# Patient Record
Sex: FEMALE | Race: WHITE | HISPANIC OR LATINO
[De-identification: names, ages, dates, MRNs, and addresses within clinical notes are randomized per-mention and may not be internally consistent; named-entity substitution may affect disease eponyms.]

---

## 2022-09-22 ENCOUNTER — APPOINTMENT (OUTPATIENT)
Dept: ANTEPARTUM | Facility: CLINIC | Age: 32
End: 2022-09-22

## 2022-09-22 ENCOUNTER — ASOB RESULT (OUTPATIENT)
Age: 32
End: 2022-09-22

## 2022-09-22 PROBLEM — Z00.00 ENCOUNTER FOR PREVENTIVE HEALTH EXAMINATION: Status: ACTIVE | Noted: 2022-09-22

## 2022-09-22 PROCEDURE — 76801 OB US < 14 WKS SINGLE FETUS: CPT

## 2022-11-03 ENCOUNTER — APPOINTMENT (OUTPATIENT)
Dept: ANTEPARTUM | Facility: CLINIC | Age: 32
End: 2022-11-03

## 2022-11-03 ENCOUNTER — ASOB RESULT (OUTPATIENT)
Age: 32
End: 2022-11-03

## 2022-11-03 PROCEDURE — 76805 OB US >/= 14 WKS SNGL FETUS: CPT

## 2022-11-03 PROCEDURE — 76817 TRANSVAGINAL US OBSTETRIC: CPT | Mod: 59

## 2022-11-17 ENCOUNTER — ASOB RESULT (OUTPATIENT)
Age: 32
End: 2022-11-17

## 2022-11-17 ENCOUNTER — APPOINTMENT (OUTPATIENT)
Dept: ANTEPARTUM | Facility: CLINIC | Age: 32
End: 2022-11-17

## 2022-11-17 PROCEDURE — 76816 OB US FOLLOW-UP PER FETUS: CPT

## 2023-01-12 ENCOUNTER — ASOB RESULT (OUTPATIENT)
Age: 33
End: 2023-01-12

## 2023-01-12 ENCOUNTER — APPOINTMENT (OUTPATIENT)
Dept: ANTEPARTUM | Facility: CLINIC | Age: 33
End: 2023-01-12
Payer: MEDICAID

## 2023-01-12 PROCEDURE — 76816 OB US FOLLOW-UP PER FETUS: CPT

## 2023-01-12 PROCEDURE — 76817 TRANSVAGINAL US OBSTETRIC: CPT

## 2023-02-19 ENCOUNTER — INPATIENT (INPATIENT)
Facility: HOSPITAL | Age: 33
LOS: 1 days | Discharge: HOME | DRG: 833 | End: 2023-02-21
Attending: OBSTETRICS & GYNECOLOGY | Admitting: OBSTETRICS & GYNECOLOGY
Payer: MEDICAID

## 2023-02-19 VITALS — SYSTOLIC BLOOD PRESSURE: 138 MMHG | DIASTOLIC BLOOD PRESSURE: 92 MMHG | HEART RATE: 107 BPM

## 2023-02-19 DIAGNOSIS — Z3A.35 35 WEEKS GESTATION OF PREGNANCY: ICD-10-CM

## 2023-02-19 DIAGNOSIS — Z00.00 ENCOUNTER FOR GENERAL ADULT MEDICAL EXAMINATION WITHOUT ABNORMAL FINDINGS: ICD-10-CM

## 2023-02-19 DIAGNOSIS — Z90.49 ACQUIRED ABSENCE OF OTHER SPECIFIED PARTS OF DIGESTIVE TRACT: Chronic | ICD-10-CM
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
## 2023-02-19 DIAGNOSIS — O14.93 UNSPECIFIED PRE-ECLAMPSIA, THIRD TRIMESTER: ICD-10-CM

## 2023-02-19 DIAGNOSIS — O47.02 FALSE LABOR BEFORE 37 COMPLETED WEEKS OF GESTATION, SECOND TRIMESTER: ICD-10-CM

## 2023-02-19 LAB
ABO RH CONFIRMATION: SIGNIFICANT CHANGE UP
ALBUMIN SERPL ELPH-MCNC: 3.1 G/DL — LOW (ref 3.3–5.2)
ALBUMIN SERPL ELPH-MCNC: 3.5 G/DL — SIGNIFICANT CHANGE UP (ref 3.3–5.2)
ALP SERPL-CCNC: 127 U/L — HIGH (ref 40–120)
ALP SERPL-CCNC: 140 U/L — HIGH (ref 40–120)
ALT FLD-CCNC: 6 U/L — SIGNIFICANT CHANGE UP
ALT FLD-CCNC: 8 U/L — SIGNIFICANT CHANGE UP
ANION GAP SERPL CALC-SCNC: 10 MMOL/L — SIGNIFICANT CHANGE UP (ref 5–17)
ANION GAP SERPL CALC-SCNC: 15 MMOL/L — SIGNIFICANT CHANGE UP (ref 5–17)
APPEARANCE UR: CLEAR — SIGNIFICANT CHANGE UP
APTT BLD: 23.1 SEC — LOW (ref 27.5–35.5)
APTT BLD: 23.2 SEC — LOW (ref 27.5–35.5)
AST SERPL-CCNC: 15 U/L — SIGNIFICANT CHANGE UP
AST SERPL-CCNC: 16 U/L — SIGNIFICANT CHANGE UP
BACTERIA # UR AUTO: ABNORMAL
BASOPHILS # BLD AUTO: 0.01 K/UL — SIGNIFICANT CHANGE UP (ref 0–0.2)
BASOPHILS # BLD AUTO: 0.02 K/UL — SIGNIFICANT CHANGE UP (ref 0–0.2)
BASOPHILS NFR BLD AUTO: 0.1 % — SIGNIFICANT CHANGE UP (ref 0–2)
BASOPHILS NFR BLD AUTO: 0.2 % — SIGNIFICANT CHANGE UP (ref 0–2)
BILIRUB SERPL-MCNC: <0.2 MG/DL — LOW (ref 0.4–2)
BILIRUB UR-MCNC: NEGATIVE — SIGNIFICANT CHANGE UP
BLD GP AB SCN SERPL QL: SIGNIFICANT CHANGE UP
BUN SERPL-MCNC: 10.6 MG/DL — SIGNIFICANT CHANGE UP (ref 8–20)
BUN SERPL-MCNC: 5.9 MG/DL — LOW (ref 8–20)
CALCIUM SERPL-MCNC: 7.6 MG/DL — LOW (ref 8.4–10.5)
CALCIUM SERPL-MCNC: 7.8 MG/DL — LOW (ref 8.4–10.5)
CHLORIDE SERPL-SCNC: 103 MMOL/L — SIGNIFICANT CHANGE UP (ref 96–108)
CHLORIDE SERPL-SCNC: 99 MMOL/L — SIGNIFICANT CHANGE UP (ref 96–108)
CO2 SERPL-SCNC: 20 MMOL/L — LOW (ref 22–29)
CO2 SERPL-SCNC: 22 MMOL/L — SIGNIFICANT CHANGE UP (ref 22–29)
COLOR SPEC: YELLOW — SIGNIFICANT CHANGE UP
COVID-19 SPIKE DOMAIN AB INTERP: POSITIVE
COVID-19 SPIKE DOMAIN ANTIBODY RESULT: >250 U/ML — HIGH
CREAT ?TM UR-MCNC: 19 MG/DL — SIGNIFICANT CHANGE UP
CREAT SERPL-MCNC: 0.54 MG/DL — SIGNIFICANT CHANGE UP (ref 0.5–1.3)
CREAT SERPL-MCNC: 0.56 MG/DL — SIGNIFICANT CHANGE UP (ref 0.5–1.3)
DIFF PNL FLD: ABNORMAL
EGFR: 125 ML/MIN/1.73M2 — SIGNIFICANT CHANGE UP
EGFR: 126 ML/MIN/1.73M2 — SIGNIFICANT CHANGE UP
EOSINOPHIL # BLD AUTO: 0 K/UL — SIGNIFICANT CHANGE UP (ref 0–0.5)
EOSINOPHIL NFR BLD AUTO: 0 % — SIGNIFICANT CHANGE UP (ref 0–6)
EPI CELLS # UR: SIGNIFICANT CHANGE UP
FIBRINOGEN PPP-MCNC: 509 MG/DL — HIGH (ref 200–450)
FIBRINOGEN PPP-MCNC: 588 MG/DL — HIGH (ref 200–450)
GLUCOSE SERPL-MCNC: 122 MG/DL — HIGH (ref 70–99)
GLUCOSE SERPL-MCNC: 123 MG/DL — HIGH (ref 70–99)
GLUCOSE UR QL: NEGATIVE MG/DL — SIGNIFICANT CHANGE UP
HCT VFR BLD CALC: 30.6 % — LOW (ref 34.5–45)
HCT VFR BLD CALC: 32.4 % — LOW (ref 34.5–45)
HGB BLD-MCNC: 10.3 G/DL — LOW (ref 11.5–15.5)
HGB BLD-MCNC: 11.1 G/DL — LOW (ref 11.5–15.5)
HIV 1 & 2 AB SERPL IA.RAPID: SIGNIFICANT CHANGE UP
HIV 1+2 AB+HIV1 P24 AG SERPL QL IA: SIGNIFICANT CHANGE UP
IMM GRANULOCYTES NFR BLD AUTO: 0.5 % — SIGNIFICANT CHANGE UP (ref 0–0.9)
IMM GRANULOCYTES NFR BLD AUTO: 0.9 % — SIGNIFICANT CHANGE UP (ref 0–0.9)
INR BLD: 0.87 RATIO — LOW (ref 0.88–1.16)
INR BLD: 0.91 RATIO — SIGNIFICANT CHANGE UP (ref 0.88–1.16)
KETONES UR-MCNC: ABNORMAL
LDH SERPL L TO P-CCNC: 180 U/L — SIGNIFICANT CHANGE UP (ref 98–192)
LEUKOCYTE ESTERASE UR-ACNC: NEGATIVE — SIGNIFICANT CHANGE UP
LYMPHOCYTES # BLD AUTO: 1.25 K/UL — SIGNIFICANT CHANGE UP (ref 1–3.3)
LYMPHOCYTES # BLD AUTO: 1.36 K/UL — SIGNIFICANT CHANGE UP (ref 1–3.3)
LYMPHOCYTES # BLD AUTO: 11.5 % — LOW (ref 13–44)
LYMPHOCYTES # BLD AUTO: 13.8 % — SIGNIFICANT CHANGE UP (ref 13–44)
MAGNESIUM SERPL-MCNC: 4.6 MG/DL — HIGH (ref 1.8–2.6)
MAGNESIUM SERPL-MCNC: 5.7 MG/DL — HIGH (ref 1.8–2.6)
MCHC RBC-ENTMCNC: 30.8 PG — SIGNIFICANT CHANGE UP (ref 27–34)
MCHC RBC-ENTMCNC: 31 PG — SIGNIFICANT CHANGE UP (ref 27–34)
MCHC RBC-ENTMCNC: 33.7 GM/DL — SIGNIFICANT CHANGE UP (ref 32–36)
MCHC RBC-ENTMCNC: 34.3 GM/DL — SIGNIFICANT CHANGE UP (ref 32–36)
MCV RBC AUTO: 90.5 FL — SIGNIFICANT CHANGE UP (ref 80–100)
MCV RBC AUTO: 91.6 FL — SIGNIFICANT CHANGE UP (ref 80–100)
MONOCYTES # BLD AUTO: 0.1 K/UL — SIGNIFICANT CHANGE UP (ref 0–0.9)
MONOCYTES # BLD AUTO: 0.71 K/UL — SIGNIFICANT CHANGE UP (ref 0–0.9)
MONOCYTES NFR BLD AUTO: 0.9 % — LOW (ref 2–14)
MONOCYTES NFR BLD AUTO: 7.2 % — SIGNIFICANT CHANGE UP (ref 2–14)
NEUTROPHILS # BLD AUTO: 7.69 K/UL — HIGH (ref 1.8–7.4)
NEUTROPHILS # BLD AUTO: 9.48 K/UL — HIGH (ref 1.8–7.4)
NEUTROPHILS NFR BLD AUTO: 78 % — HIGH (ref 43–77)
NEUTROPHILS NFR BLD AUTO: 86.9 % — HIGH (ref 43–77)
NITRITE UR-MCNC: NEGATIVE — SIGNIFICANT CHANGE UP
PH UR: 7 — SIGNIFICANT CHANGE UP (ref 5–8)
PLATELET # BLD AUTO: 297 K/UL — SIGNIFICANT CHANGE UP (ref 150–400)
PLATELET # BLD AUTO: 320 K/UL — SIGNIFICANT CHANGE UP (ref 150–400)
POTASSIUM SERPL-MCNC: 3.7 MMOL/L — SIGNIFICANT CHANGE UP (ref 3.5–5.3)
POTASSIUM SERPL-MCNC: 3.9 MMOL/L — SIGNIFICANT CHANGE UP (ref 3.5–5.3)
POTASSIUM SERPL-SCNC: 3.7 MMOL/L — SIGNIFICANT CHANGE UP (ref 3.5–5.3)
POTASSIUM SERPL-SCNC: 3.9 MMOL/L — SIGNIFICANT CHANGE UP (ref 3.5–5.3)
PROT ?TM UR-MCNC: <4 MG/DL — SIGNIFICANT CHANGE UP (ref 0–12)
PROT SERPL-MCNC: 6.2 G/DL — LOW (ref 6.6–8.7)
PROT SERPL-MCNC: 6.9 G/DL — SIGNIFICANT CHANGE UP (ref 6.6–8.7)
PROT UR-MCNC: NEGATIVE — SIGNIFICANT CHANGE UP
PROT/CREAT UR-RTO: <0.2 RATIO — SIGNIFICANT CHANGE UP
PROTHROM AB SERPL-ACNC: 10.1 SEC — LOW (ref 10.5–13.4)
PROTHROM AB SERPL-ACNC: 10.6 SEC — SIGNIFICANT CHANGE UP (ref 10.5–13.4)
RBC # BLD: 3.34 M/UL — LOW (ref 3.8–5.2)
RBC # BLD: 3.58 M/UL — LOW (ref 3.8–5.2)
RBC # FLD: 13 % — SIGNIFICANT CHANGE UP (ref 10.3–14.5)
RBC # FLD: 13.2 % — SIGNIFICANT CHANGE UP (ref 10.3–14.5)
RBC CASTS # UR COMP ASSIST: SIGNIFICANT CHANGE UP /HPF (ref 0–4)
SARS-COV-2 IGG+IGM SERPL QL IA: >250 U/ML — HIGH
SARS-COV-2 IGG+IGM SERPL QL IA: POSITIVE
SODIUM SERPL-SCNC: 134 MMOL/L — LOW (ref 135–145)
SODIUM SERPL-SCNC: 135 MMOL/L — SIGNIFICANT CHANGE UP (ref 135–145)
SP GR SPEC: 1.01 — SIGNIFICANT CHANGE UP (ref 1.01–1.02)
URATE SERPL-MCNC: 5.6 MG/DL — SIGNIFICANT CHANGE UP (ref 2.4–5.7)
URATE SERPL-MCNC: 6 MG/DL — HIGH (ref 2.4–5.7)
UROBILINOGEN FLD QL: NEGATIVE MG/DL — SIGNIFICANT CHANGE UP
WBC # BLD: 10.91 K/UL — HIGH (ref 3.8–10.5)
WBC # BLD: 9.86 K/UL — SIGNIFICANT CHANGE UP (ref 3.8–10.5)
WBC # FLD AUTO: 10.91 K/UL — HIGH (ref 3.8–10.5)
WBC # FLD AUTO: 9.86 K/UL — SIGNIFICANT CHANGE UP (ref 3.8–10.5)
WBC UR QL: NEGATIVE /HPF — SIGNIFICANT CHANGE UP (ref 0–5)

## 2023-02-19 RX ORDER — AMPICILLIN TRIHYDRATE 250 MG
2 CAPSULE ORAL ONCE
Refills: 0 | Status: COMPLETED | OUTPATIENT
Start: 2023-02-19 | End: 2023-02-19

## 2023-02-19 RX ORDER — CHLORHEXIDINE GLUCONATE 213 G/1000ML
1 SOLUTION TOPICAL ONCE
Refills: 0 | Status: DISCONTINUED | OUTPATIENT
Start: 2023-02-19 | End: 2023-02-19

## 2023-02-19 RX ORDER — ACETAMINOPHEN 500 MG
1000 TABLET ORAL ONCE
Refills: 0 | Status: COMPLETED | OUTPATIENT
Start: 2023-02-19 | End: 2023-02-19

## 2023-02-19 RX ORDER — OXYTOCIN 10 UNIT/ML
333.33 VIAL (ML) INJECTION
Qty: 20 | Refills: 0 | Status: DISCONTINUED | OUTPATIENT
Start: 2023-02-19 | End: 2023-02-19

## 2023-02-19 RX ORDER — MAGNESIUM SULFATE 500 MG/ML
2 VIAL (ML) INJECTION
Qty: 40 | Refills: 0 | Status: DISCONTINUED | OUTPATIENT
Start: 2023-02-19 | End: 2023-02-19

## 2023-02-19 RX ORDER — AMPICILLIN TRIHYDRATE 250 MG
1 CAPSULE ORAL EVERY 4 HOURS
Refills: 0 | Status: DISCONTINUED | OUTPATIENT
Start: 2023-02-19 | End: 2023-02-19

## 2023-02-19 RX ORDER — SODIUM CHLORIDE 9 MG/ML
1000 INJECTION, SOLUTION INTRAVENOUS
Refills: 0 | Status: DISCONTINUED | OUTPATIENT
Start: 2023-02-19 | End: 2023-02-19

## 2023-02-19 RX ORDER — CITRIC ACID/SODIUM CITRATE 300-500 MG
30 SOLUTION, ORAL ORAL ONCE
Refills: 0 | Status: DISCONTINUED | OUTPATIENT
Start: 2023-02-19 | End: 2023-02-19

## 2023-02-19 RX ADMIN — Medication 400 MILLIGRAM(S): at 05:05

## 2023-02-19 RX ADMIN — Medication 50 GM/HR: at 04:46

## 2023-02-19 RX ADMIN — Medication 108 GRAM(S): at 05:00

## 2023-02-19 RX ADMIN — Medication 12 MILLIGRAM(S): at 21:05

## 2023-02-19 RX ADMIN — Medication 1000 MILLIGRAM(S): at 05:50
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
## 2023-02-19 NOTE — CONSULT NOTE ADULT - PROBLEM SELECTOR RECOMMENDATION 3
- Blood type: A+  - Repeat CBC, coags pending  - No acute concern for placental abruption given clinical status
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
## 2023-02-19 NOTE — OB RN PATIENT PROFILE - EMPLOYMENT STATUS, PROFILE
unemployed
How Severe Is Your Acne?: mild
Is This A New Presentation, Or A Follow-Up?: Acne
What Type Of Note Output Would You Prefer (Optional)?: Bullet Format

## 2023-02-19 NOTE — OB PROVIDER H&P - ASSESSMENT
32y  at 35weeks GA by LMP consistent with 2nd trimester sono who presents to L&D as a transfer from [facility] for elevated BPs. Will admit for pre-eclampsia without severe features.    A/P:   -Admit to L&D  -Consent  -Admission labs  -PIH labs  -BPs 120s-140s- 70s-90s upon presentation  -BPs were elevated to 150s/90s when patient was at [facility].   -PIH labs at [facility] show a P:C ratio of 0.5.  -Patient was started on Magnesium while at [place]  -Received one dose of betamethasone. Will received second dose in 24 hours after the first dose.  -Bedside Sono reassuring, BPP 8/8 with no evidence of gross placental abruption.  -Fetus: Cat I tracing. Continuous toco and fetal monitoring.     Discussed with Dr. David
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
## 2023-02-19 NOTE — OB PROVIDER H&P - NSLOWPPHRISK_OBGYN_A_OB
No previous uterine incision/[place] Pregnancy/Less than or equal to 4 previous vaginal births/No known bleeding disorder/No history of postpartum hemorrhage

## 2023-02-19 NOTE — CONSULT NOTE ADULT - PROBLEM SELECTOR RECOMMENDATION 9
- Reports good fetal movement after prolonged evaluation  - Bedside US: BPP 8/8, Cephalic, anterior placenta. Areas of placental lakes. CLAUDIA -9.82cm  - Last [facility] US (2023): EFW 1453  gm      3 lb 3 oz      44  %, Vertex, anterior placenta  - Continuous FHT/[place]  - Betamethasone 12mg IM administered @  due to presumed diagnosis of PECwSF, repeat dose @ . Neonates whose mothers received  corticosteroids have significantly lower severity, frequency, or both, of respiratory distress syndrome, intracranial hemorrhage, necrotizing enterocolitis, and death.   - Neonatology consult pending - Reports good fetal movement after prolonged evaluation  - Bedside US: BPP 8/8, Cephalic, anterior placenta. Areas of placental lakes. CLAUDIA -9.82cm  - Last [facility] US (2023): EFW 1453  gm  3 lb 3 oz (44%), Vertex, anterior placenta  - Continuous FHT/[place]  - Betamethasone 12mg IM administered @  due to presumed diagnosis of PECwSF, repeat dose @ 210. Neonates whose mothers received  corticosteroids have significantly lower severity, frequency, or both, of respiratory distress syndrome, intracranial hemorrhage, necrotizing enterocolitis, and death.   - Neonatology consult pending

## 2023-02-19 NOTE — OB PROVIDER H&P - NSHPPHYSICALEXAM_GEN_ALL_CORE
T(C): 36.7 (02-19-23 @ 03:26), Max: 36.7 (02-19-23 @ 03:26)  HR: 105 (02-19-23 @ 03:38) (99 - 109)  BP: 145/92 (02-19-23 @ 03:38) (138/89 - 156/87)  RR: 19 (02-19-23 @ 03:26) (19 - 19)    Gen: NAD, well-appearing, AAOx3   Abd: Soft, gravid  Ext: non-tender, non-edematous  SSE: closed cervix with no evidence of bleeding  SVE:  0/0/-3  Bedside sono: vertex presentation, anterior placenta, gross fetal movements noted, good tone presents, breathing movements presents, CLAUDIA 9.86  FHT: baseline FHR 120s, moderate variability +accels, -decels  [place]: contractions every 5-6 minutes
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
## 2023-02-19 NOTE — OB RN PATIENT PROFILE - FALL HARM RISK - UNIVERSAL INTERVENTIONS
Bed in lowest position, wheels locked, appropriate side rails in place/Call bell, personal items and telephone in reach/Instruct patient to call for assistance before getting out of bed or chair/Non-slip footwear when patient is out of bed/[place] to call system/Physically safe environment - no spills, clutter or unnecessary equipment/Purposeful Proactive Rounding/Room/bathroom lighting operational, light cord in reach

## 2023-02-20 ENCOUNTER — TRANSCRIPTION ENCOUNTER (OUTPATIENT)
Age: 33
End: 2023-02-20

## 2023-02-20 LAB
ALBUMIN SERPL ELPH-MCNC: 3.1 G/DL — LOW (ref 3.3–5.2)
ALP SERPL-CCNC: 122 U/L — HIGH (ref 40–120)
ALT FLD-CCNC: 7 U/L — SIGNIFICANT CHANGE UP
ANION GAP SERPL CALC-SCNC: 12 MMOL/L — SIGNIFICANT CHANGE UP (ref 5–17)
APTT BLD: 22.2 SEC — LOW (ref 27.5–35.5)
AST SERPL-CCNC: 15 U/L — SIGNIFICANT CHANGE UP
BASOPHILS # BLD AUTO: 0.01 K/UL — SIGNIFICANT CHANGE UP (ref 0–0.2)
BASOPHILS NFR BLD AUTO: 0.1 % — SIGNIFICANT CHANGE UP (ref 0–2)
BILIRUB SERPL-MCNC: <0.2 MG/DL — LOW (ref 0.4–2)
BUN SERPL-MCNC: 11.1 MG/DL — SIGNIFICANT CHANGE UP (ref 8–20)
C TRACH RRNA SPEC QL NAA+PROBE: SIGNIFICANT CHANGE UP
CALCIUM SERPL-MCNC: 8.9 MG/DL — SIGNIFICANT CHANGE UP (ref 8.4–10.5)
CHLORIDE SERPL-SCNC: 106 MMOL/L — SIGNIFICANT CHANGE UP (ref 96–108)
CO2 SERPL-SCNC: 22 MMOL/L — SIGNIFICANT CHANGE UP (ref 22–29)
CREAT SERPL-MCNC: 0.41 MG/DL — LOW (ref 0.5–1.3)
EGFR: 135 ML/MIN/1.73M2 — SIGNIFICANT CHANGE UP
EOSINOPHIL # BLD AUTO: 0 K/UL — SIGNIFICANT CHANGE UP (ref 0–0.5)
EOSINOPHIL NFR BLD AUTO: 0 % — SIGNIFICANT CHANGE UP (ref 0–6)
FIBRINOGEN PPP-MCNC: 488 MG/DL — HIGH (ref 200–450)
GLUCOSE SERPL-MCNC: 114 MG/DL — HIGH (ref 70–99)
HCT VFR BLD CALC: 30.6 % — LOW (ref 34.5–45)
HGB BLD-MCNC: 10.1 G/DL — LOW (ref 11.5–15.5)
IMM GRANULOCYTES NFR BLD AUTO: 1.5 % — HIGH (ref 0–0.9)
INR BLD: 0.89 RATIO — SIGNIFICANT CHANGE UP (ref 0.88–1.16)
LDH SERPL L TO P-CCNC: 175 U/L — SIGNIFICANT CHANGE UP (ref 98–192)
LYMPHOCYTES # BLD AUTO: 1.42 K/UL — SIGNIFICANT CHANGE UP (ref 1–3.3)
LYMPHOCYTES # BLD AUTO: 14.3 % — SIGNIFICANT CHANGE UP (ref 13–44)
MCHC RBC-ENTMCNC: 30.8 PG — SIGNIFICANT CHANGE UP (ref 27–34)
MCHC RBC-ENTMCNC: 33 GM/DL — SIGNIFICANT CHANGE UP (ref 32–36)
MCV RBC AUTO: 93.3 FL — SIGNIFICANT CHANGE UP (ref 80–100)
MONOCYTES # BLD AUTO: 0.26 K/UL — SIGNIFICANT CHANGE UP (ref 0–0.9)
MONOCYTES NFR BLD AUTO: 2.6 % — SIGNIFICANT CHANGE UP (ref 2–14)
N GONORRHOEA RRNA SPEC QL NAA+PROBE: SIGNIFICANT CHANGE UP
NEUTROPHILS # BLD AUTO: 8.09 K/UL — HIGH (ref 1.8–7.4)
NEUTROPHILS NFR BLD AUTO: 81.5 % — HIGH (ref 43–77)
PLATELET # BLD AUTO: 282 K/UL — SIGNIFICANT CHANGE UP (ref 150–400)
POTASSIUM SERPL-MCNC: 4.3 MMOL/L — SIGNIFICANT CHANGE UP (ref 3.5–5.3)
POTASSIUM SERPL-SCNC: 4.3 MMOL/L — SIGNIFICANT CHANGE UP (ref 3.5–5.3)
PROT SERPL-MCNC: 6.5 G/DL — LOW (ref 6.6–8.7)
PROTHROM AB SERPL-ACNC: 10.3 SEC — LOW (ref 10.5–13.4)
RBC # BLD: 3.28 M/UL — LOW (ref 3.8–5.2)
RBC # FLD: 13.3 % — SIGNIFICANT CHANGE UP (ref 10.3–14.5)
SODIUM SERPL-SCNC: 140 MMOL/L — SIGNIFICANT CHANGE UP (ref 135–145)
T PALLIDUM AB TITR SER: NEGATIVE — SIGNIFICANT CHANGE UP
URATE SERPL-MCNC: 5.4 MG/DL — SIGNIFICANT CHANGE UP (ref 2.4–5.7)
WBC # BLD: 9.93 K/UL — SIGNIFICANT CHANGE UP (ref 3.8–10.5)
WBC # FLD AUTO: 9.93 K/UL — SIGNIFICANT CHANGE UP (ref 3.8–10.5)

## 2023-02-20 NOTE — DISCHARGE NOTE ANTEPARTUM - PROVIDER TOKENS
PROVIDER:[TOKEN:[[id number]]] PROVIDER:[TOKEN:[[id number]]],PROVIDER:[TOKEN:[[id number]],SCHEDULEDAPPT:[02/24/2023],SCHEDULEDAPPTTIME:[08:00 AM]]

## 2023-02-20 NOTE — DISCHARGE NOTE ANTEPARTUM - PATIENT PORTAL LINK FT
You can access the FollowMyHealth Patient Portal offered by [facility] by registering at the following website: [email or web address]. By joining [employer]’s FollowMyHealth portal, you will also be able to view your health information using other applications (apps) compatible with our system.

## 2023-02-20 NOTE — DISCHARGE NOTE ANTEPARTUM - CARE PROVIDER_API CALL
Esther Zaragoza)  Obstetrics and Gynecology  [address] [place] [address]  Phone: [phone]  Fax: [phone]  Follow Up Time:    Esther Zaragoza)  Obstetrics and Gynecology  [facility] [place] [address]  Phone: [phone]  Fax: [phone]  Follow Up Time:     Jericho Lee)  MaternalFetal Medicine; Obstetrics and Gynecology  [address] [place] [address], [address]  [place] [address]  Phone: [phone]  Fax: [phone]  Scheduled Appointment: 02/24/2023 08:00 AM
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
## 2023-02-20 NOTE — DISCHARGE NOTE ANTEPARTUM - CARE PLAN
Principal Discharge DX:	35 weeks gestation of pregnancy  Assessment and plan of treatment:	Patient should transition to regular activity level. Resume regular diet. Patient should follow up with her OB for an obstetric appointment within one week . Patient should call her doctor sooner if she develops a fever or uncontrolled vaginal bleeding. Please call sooner if there are any other concerns.  Secondary Diagnosis:	Preeclampsia, third trimester  Assessment and plan of treatment:	Call your doctor or return to the hospital if you have headaches that do not resolve with tylenol, vision changes, upper abdominal pain, or elevated blood pressures greater than 150/100.   1 Principal Discharge DX:	35 weeks gestation of pregnancy  Assessment and plan of treatment:	Patient should transition to regular activity level. Resume regular diet. Patient should follow up with her OB for an obstetric appointment within one week . Patient should call her doctor sooner if she develops a fever or uncontrolled vaginal bleeding. Please call sooner if there are any other concerns.  Please continue your regular OB prenatal appointments at [facility].  Secondary Diagnosis:	Preeclampsia, third trimester  Assessment and plan of treatment:	Call your doctor or return to the hospital if you have headaches that do not resolve with Tylenol, vision changes, upper abdominal pain, or elevated blood pressures greater than 150/100.  Please check BP at home 2 times a day while at rest and record values.  Please follow with Maternal Fetal Medicine office in [place] 2 times per week.   Principal Discharge DX:	35 weeks gestation of pregnancy  Assessment and plan of treatment:	Patient should transition to regular activity level. Resume regular diet. Patient should follow up with her OB for an obstetric appointment within one week . Patient should call her doctor sooner if she develops a fever or uncontrolled vaginal bleeding. Please call sooner if there are any other concerns.  Please continue your regular OB prenatal appointments at [facility].  Secondary Diagnosis:	Preeclampsia, third trimester  Assessment and plan of treatment:	Call your doctor or return to the hospital if you have headaches that do not resolve with Tylenol, vision changes, upper abdominal pain, or elevated blood pressures greater than 150/100.  Please check BP at home 2 times a day while at rest and record values.  Please follow with [facility] office in [place] 2 times per week for ultrasound.   Your first appointment with the [facility] office is this Friday 2/24 at 8am.  Your next appointment with your doctor is Monday 2/27.  Please call your insurance company and the information provided by the hospital to organize transport to your appointments at least 3 days before they are scheduled.   Principal Discharge DX:	35 weeks gestation of pregnancy  Assessment and plan of treatment:	Patient should transition to regular activity level. Resume regular diet. Patient should follow up with her OB for an obstetric appointment within one week . Patient should call her doctor sooner if she develops a fever or uncontrolled vaginal bleeding. Please call sooner if there are any other concerns.  Please continue your regular OB prenatal appointments at [facility].  Secondary Diagnosis:	Preeclampsia, third trimester  Assessment and plan of treatment:	Call your doctor or return to the hospital if you have headaches that do not resolve with Tylenol, vision changes, upper abdominal pain, or elevated blood pressures greater than 150/100.  Please check BP at home 2 times a day while at rest and record values.  Please follow with [facility] office in [place] 2 times per week for ultrasound.   Your first appointment with the [facility] office is this Friday 2/24 at 8am.  Please call your insurance company and the information provided by the hospital to organize transport to your appointments at least 3 days before they are scheduled.   Principal Discharge DX:	35 weeks gestation of pregnancy  Assessment and plan of treatment:	Patient should transition to regular activity level. Resume regular diet. Patient should follow up with her OB for an obstetric appointment within one week . Patient should call her doctor sooner if she develops a fever or uncontrolled vaginal bleeding. Please call sooner if there are any other concerns.  Please continue your regular OB prenatal appointments at [facility]  Secondary Diagnosis:	Preeclampsia, third trimester  Assessment and plan of treatment:	Elevated BP readings attributed to chronic hypertension not preeclampsia. Call your doctor or return to the hospital if you have headaches that do not resolve with Tylenol, vision changes, upper abdominal pain, or elevated blood pressures greater than 140/90. Please check BP at home 2 times a day while at rest and record values.  Please follow with [facility] office in [place] 2 times per week for ultrasound examinations. Ultrasound and MFM follow up in 3 days at [place] [facility] office. Your first appointment with the [facility] office is this Friday 2/24 at 8am. Please call your insurance company and the information provided by the hospital to organize transport to your appointments at least 3 days before they are scheduled.

## 2023-02-20 NOTE — DISCHARGE NOTE ANTEPARTUM - HOSPITAL COURSE
32year old   EDC 3/26/23 at 353 1/7 weeks gestation transferred from [facility] for PECwoSF (based on proteinuria and elevated BPs at [facility]). Upon arrival to [facility], blood pressures remained within normal limits. Fetal monitoring remained reactive during evaluation, and betamethasone complete as of  @ 2107.   32year old   EDC 3/26/23 at 33 1/7 weeks gestation transferred [place] [facility] [place]ECwo[facility] (based on proteinuria and elevated BPs at [facility] Upon arrival to [facility], blood pressures remained within normal limits. Fetal monitoring remained reactive during evaluation, and betamethasone complete as of  @ 2109.

## 2023-02-20 NOTE — DISCHARGE NOTE ANTEPARTUM - MEDICATION SUMMARY - MEDICATIONS TO TAKE
I will START or STAY ON the medications listed below when I get home from the hospital:    Blood pressure cuff/kit  -- 1 kit, take BPs 2 times a day   -- Indication: For Hypertension
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
## 2023-02-20 NOTE — DISCHARGE NOTE ANTEPARTUM - PLAN OF CARE
Call your doctor or return to the hospital if you have headaches that do not resolve with tylenol, vision changes, upper abdominal pain, or elevated blood pressures greater than 150/100. Patient should transition to regular activity level. Resume regular diet. Patient should follow up with her OB for an obstetric appointment within one week . Patient should call her doctor sooner if she develops a fever or uncontrolled vaginal bleeding. Please call sooner if there are any other concerns. Patient should transition to regular activity level. Resume regular diet. Patient should follow up with her OB for an obstetric appointment within one week . Patient should call her doctor sooner if she develops a fever or uncontrolled vaginal bleeding. Please call sooner if there are any other concerns.  Please continue your regular OB prenatal appointments at [facility]. Call your doctor or return to the hospital if you have headaches that do not resolve with Tylenol, vision changes, upper abdominal pain, or elevated blood pressures greater than 150/100.  Please check BP at home 2 times a day while at rest and record values.  Please follow with Maternal Fetal Medicine office in [place] 2 times per week. Call your doctor or return to the hospital if you have headaches that do not resolve with Tylenol, vision changes, upper abdominal pain, or elevated blood pressures greater than 150/100.  Please check BP at home 2 times a day while at rest and record values.  Please follow with [facility] office in [place] 2 times per week for ultrasound.   Your first appointment with the [facility] office is this Friday 2/24 at 8am.  Your next appointment with your doctor is Monday 2/27.  Please call your insurance company and the information provided by the hospital to organize transport to your appointments at least 3 days before they are scheduled. Call your doctor or return to the hospital if you have headaches that do not resolve with Tylenol, vision changes, upper abdominal pain, or elevated blood pressures greater than 150/100.  Please check BP at home 2 times a day while at rest and record values.  Please follow with [facility] office in [place] 2 times per week for ultrasound.   Your first appointment with the [facility] office is this Friday 2/24 at 8am.  Please call your insurance company and the information provided by the hospital to organize transport to your appointments at least 3 days before they are scheduled. Elevated BP readings attributed to chronic hypertension not preeclampsia. Call your doctor or return to the hospital if you have headaches that do not resolve with Tylenol, vision changes, upper abdominal pain, or elevated blood pressures greater than 140/90. Please check BP at home 2 times a day while at rest and record values.  Please follow with [facility] office in [place] 2 times per week for ultrasound examinations. Ultrasound and MFM follow up in 3 days at [place] [facility] office. Your first appointment with the [facility] office is this Friday 2/24 at 8am. Please call your insurance company and the information provided by the hospital to organize transport to your appointments at least 3 days before they are scheduled. Patient should transition to regular activity level. Resume regular diet. Patient should follow up with her OB for an obstetric appointment within one week . Patient should call her doctor sooner if she develops a fever or uncontrolled vaginal bleeding. Please call sooner if there are any other concerns.  Please continue your regular OB prenatal appointments at [facility]

## 2023-02-20 NOTE — PROGRESS NOTE ADULT - PROBLEM SELECTOR PLAN 2
- Patient with BPs 140s-150s/80s-90s for >4hrs  - P/C 0.5 at [facility], but <0.2 at [facility]  - Other labs WNL  - BPs normotensive since yesterday AM  - As patient does note meet severe BP, clinical or lab criteria, magnesium was discontinued  - Completed ACS course of betamethasone  - Continue to monitor BPs q4hrs  - AM labs pending  - If severe criteria develop, will begin induction  - If BPs remain normotensive, will consider discharge home with 2x/week  testing and home BP monitoring  - Patient counseled extensively on concerning symptoms and return precautions

## 2023-02-20 NOTE — DISCHARGE NOTE ANTEPARTUM - REST! DO NOT DO HEAVY HOUSEWORK, LIFTING OR STRENOUS EXERCISE FOR TWO WEEKS
-- Message is from the [facility]--    Reason for Call: Pt wanted to notify provider the results from ECHO done on 01/18/19 should be to her soon. Pt also has an MRI with Dr. Lloyd 02/01/19 at 6:30am and follow up from MRI with Dr. Lloyd on 02/07 at 9:30am. Please advise.     Caller Information       Type Contact Phone    01/28/2019 02:16 PM Phone (Incoming) Rose Arauz (Self) [phone] (H)          Alternative phone number: n/a    Turnaround time given to caller:   \"This message will be sent to [state Provider's name]. The clinical team will fulfill your request as soon as they review your message.\"     Statement Selected
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
## 2023-02-20 NOTE — DISCHARGE NOTE ANTEPARTUM - CARE PROVIDERS DIRECT ADDRESSES
,DirectAddress_Unknown ,DirectAddress_Unknown,[email or web address]

## 2023-02-21 ENCOUNTER — TRANSCRIPTION ENCOUNTER (OUTPATIENT)
Age: 33
End: 2023-02-21

## 2023-02-21 VITALS — SYSTOLIC BLOOD PRESSURE: 137 MMHG | DIASTOLIC BLOOD PRESSURE: 86 MMHG | HEART RATE: 74 BPM

## 2023-02-21 DIAGNOSIS — O99.013 ANEMIA COMPLICATING PREGNANCY, THIRD TRIMESTER: ICD-10-CM

## 2023-02-21 DIAGNOSIS — I10 ESSENTIAL (PRIMARY) HYPERTENSION: ICD-10-CM

## 2023-02-21 PROCEDURE — 86703 HIV-1/HIV-2 1 RESULT ANTBDY: CPT

## 2023-02-21 PROCEDURE — 84550 ASSAY OF BLOOD/URIC ACID: CPT

## 2023-02-21 PROCEDURE — 87591 N.GONORRHOEAE DNA AMP PROB: CPT

## 2023-02-21 PROCEDURE — 86901 BLOOD TYPING SEROLOGIC RH(D): CPT

## 2023-02-21 PROCEDURE — 85730 THROMBOPLASTIN TIME PARTIAL: CPT

## 2023-02-21 PROCEDURE — 86900 BLOOD TYPING SEROLOGIC ABO: CPT

## 2023-02-21 PROCEDURE — 86850 RBC ANTIBODY SCREEN: CPT

## 2023-02-21 PROCEDURE — 86780 TREPONEMA PALLIDUM: CPT

## 2023-02-21 PROCEDURE — 83615 LACTATE (LD) (LDH) ENZYME: CPT

## 2023-02-21 PROCEDURE — 87389 HIV-1 AG W/HIV-1&-2 AB AG IA: CPT

## 2023-02-21 PROCEDURE — 84156 ASSAY OF PROTEIN URINE: CPT

## 2023-02-21 PROCEDURE — 83735 ASSAY OF MAGNESIUM: CPT

## 2023-02-21 PROCEDURE — 85025 COMPLETE CBC W/AUTO DIFF WBC: CPT

## 2023-02-21 PROCEDURE — 85610 PROTHROMBIN TIME: CPT

## 2023-02-21 PROCEDURE — 87491 CHLMYD TRACH DNA AMP PROBE: CPT

## 2023-02-21 PROCEDURE — 36415 COLL VENOUS BLD VENIPUNCTURE: CPT

## 2023-02-21 PROCEDURE — 86769 SARS-COV-2 COVID-19 ANTIBODY: CPT

## 2023-02-21 PROCEDURE — 81001 URINALYSIS AUTO W/SCOPE: CPT

## 2023-02-21 PROCEDURE — 80053 COMPREHEN METABOLIC PANEL: CPT

## 2023-02-21 PROCEDURE — 82570 ASSAY OF URINE CREATININE: CPT

## 2023-02-21 NOTE — PROGRESS NOTE ADULT - PROBLEM SELECTOR PLAN 4
- Keep type and screen active  - SCDs for DVT ppx  - Regular diet Hgb 10.1  / Hct 30.6  Anemia can be associated with low birth weight infants and  birth  Iron supplementation is suggested

## 2023-02-21 NOTE — DISCHARGE NOTE NURSING/CASE MANAGEMENT/SOCIAL WORK - NSDCPEFALRISK_GEN_ALL_CORE
For information on Fall & Injury Prevention, visit: [email or web address] OR  [email or web address] OR  https://www.cdc.gov/steadi/patient.html

## 2023-02-21 NOTE — PROGRESS NOTE ADULT - PROBLEM SELECTOR PLAN 2
- PEC w/o SF  - Patient with BPs 140s-150s/80s-90s for >4hrs upon arrival  - P/C 0.5 at [facility], but <0.2 at [facility]  - All other [facility] labs wnl x3.   -  BPs normotensive since  1400.   - Magnesium sulfate infusion had previously been started at [facility], however then discontinued, as patient does not meet severe BP, clinical or lab criteria for PEC w/ SF.   - Patient completed ACS course of betamethasone  and  @ 2100.   - Continue to monitor BPs q4hrs  - As BPs have remained normotensive, will consider discharge home with 2x/week  testing and home BP monitoring  - Patient counseled extensively on concerning symptoms and return precautions. - PEC w/o SF  - Patient with BPs 140s-150s/80s-90s for >4hrs upon arrival  - P/C 0.5 at [facility], but <0.2 at [facility]  - All other [facility] labs wnl x3.   -  BPs normotensive since  1400.   - Magnesium sulfate infusion had previously been started at [facility], however then discontinued, as patient does not meet severe BP, clinical or lab criteria for PEC w/ SF.   - Patient completed ACS course of betamethasone  and  @ 2100.   - Patient denies signs/symptoms of PEC.   - Continue to monitor BPs q4hrs  - As BPs have remained normotensive, will consider discharge home with 2x/week  testing and home BP monitoring  - Patient counseled extensively on concerning symptoms and return precautions. - Patient outpatient records reviewed and showed patient with 2 elevated /91 and 137/93 at 11w and 15w GA; patient meeting criteria for cHTN.  - Baseline PEC labs drawn at that time and wnl. P/C ratio 0f 0.1.    On this admission:  - Patient with BPs 140s-150s/80s-90s for >4hrs upon arrival  - P/C 0.5 at [facility], but <0.2 at [facility]  - All other [facility] labs wnl x3.   -  BPs normotensive since 2/19 1100. No severe range BP noted.   - Magnesium sulfate infusion had previously been started at [facility], however discontinued at [facility], as patient does not meet severe BP, clinical or lab criteria for PEC w/ SF.   - Patient completed ACS course of betamethasone 2/18 and 2/19 @ 2100.   - Patient denies signs/symptoms of PEC.   - Continue to monitor BPs q4hrs - Patient outpatient records reviewed and showed patient with 2 elevated /91 and 137/93 at 11w and 15w GA; patient meeting criteria for cHTN.  - Baseline PEC labs drawn at that time and wnl. P/C ratio 0f 0.1.    On this admission:  - Patient with BPs 140s-150s/80s-90s for >4hrs upon arrival  - P/C 0.5 at [facility], but <0.2 at [facility]  - All other [facility] labs wnl x3.   -  BPs normotensive since  1100. No severe range BP noted.   - Magnesium sulfate infusion had previously been started at [facility], however discontinued at [facility], as patient does not meet severe BP, clinical or lab criteria for PEC w/ SF.   - Patient completed ACS course of betamethasone  and  @ 2100.   - Patient denies signs/symptoms of PEC.   - Continue to monitor BPs q4hrs  - Patient most likely to have had cHTN exacerbation. BP and labwork wnl. Recommend close followup outpatient with 2/weekly  testing with MFM. - Patient outpatient records reviewed and showed patient with 2 elevated /91 and 137/93 at 11w and 15w GA; patient meeting criteria for cHTN.  - Baseline PEC labs drawn at that time and wnl. P/C ratio 0f 0.1.    On this admission:  - Patient with BPs 140s-150s/80s-90s for >4hrs upon arrival  - [place] [facility] but <0.2 at [facility]  - All other [facility] labs wnl x3.   -  BPs normotensive since  1100. No severe range BP noted.   - Magnesium sulfate infusion had previously been [place] [facility] however, discontinued at [facility], as patient does not have severe symptoms, BP elevation criteria, or lab criteria for PEC w/ SF.   - Patient completed ACS course of betamethasone  and  @ 2100.   - Patient denies signs/symptoms of PEC.   - Continue to monitor BPs q4hrs  - Patient most likely to have had cHTN exacerbation. BP and labwork wnl. Recommend close followup outpatient with 2/weekly  testing with MFM.

## 2023-02-21 NOTE — PROGRESS NOTE ADULT - SUBJECTIVE AND OBJECTIVE BOX
TOMY GAINES  [facility] 2EST  01      A 32year old  @ 35 2/7 weeks gestation by LMP (EDC 3/26/23, LMP 22) transferred from [facility] for PECwoSF, now s/p BMZ 2- and 2-.   BMI: 24      S:    INCOMPLETE    O:    T(C): 36.9 (23 @ 04:08), Max: 37.2 (23 @ 11:59)  HR: 58 (23 @ 04:08) (58 - 90)  BP: 113/72 (23 @ 04:08) (112/60 - 132/78)  RR: 18 (23 @ 04:08) (18 - 18)  SpO2: 97% (23 @ 04:08) (96% - 99%)    PE: INCOMPLETE                        10.1   9.93  )-----------( 282      ( 2023 05:20 )             30.6         140  |  106  |  11.1  ----------------------------<  114<H>  4.3   |  22.0  |  0.41<L>    Ca    8.9      2023 05:20  Mg     5.7         TPro  6.5<L>  /  Alb  3.1<L>  /  TBili  <0.2<L>  /  DBili  x   /  AST  15  /  ALT  7   /  AlkPhos  122<H>       TOMY GAINES  [facility] 2EST  01      A 32year old  @ 35 2/7 weeks gestation by LMP (EDC 3/26/23, LMP 22) transferred from [facility] for PECwoSF, now s/p BMZ 2-18 and 2-19.   BMI: 24      S:    Patient seen at bedside.   Patient reports feeling well this morning.   She denies headaches, blurry vision, chest pain, shortness of breath and right upper quadrant pain.   She reports good fetal movement. She denies contraction, vaginal bleeding and leakage of fluid.      O:    T(C): 36.9 (23 @ 04:08), Max: 37.2 (23 @ 11:59)  HR: 58 (23 @ 04:08) (58 - 90)  BP: 113/72 (23 @ 04:08) (112/60 - 132/78)  RR: 18 (23 @ 04:08) (18 - 18)  SpO2: 97% (23 @ 04:08) (96% - 99%)    PE:   General: Adult female in NAD  CVS: RRR, +S1/S2, no murmurs  Lungs: CTAB, no wheezing, rhonchi or rales  Abdomen: soft, non-tender, gravid uterus  Pelvic: Deferred  Ext: No cyanosis, edema or calf tenderness                            10.1   9.93  )-----------( 282      ( 2023 05:20 )             30.6         140  |  106  |  11.1  ----------------------------<  114<H>  4.3   |  22.0  |  0.41<L>    Ca    8.9      2023 05:20  Mg     5.7         TPro  6.5<L>  /  Alb  3.1<L>  /  TBili  <0.2<L>  /  DBili  x   /  AST  15  /  ALT  7   /  AlkPhos  122<H>       TOMY GAINES  [facility] 2EST  01      A 32year old  @ 35 2/7 weeks gestation by LMP (EDC 3/26/23, LMP 22) transferred from [facility] for PECwoSF, now s/p BMZ 2-18 and 2-19.   BMI: 24    S:    Patient seen at bedside.   Patient reports feeling well this morning.   She denies headaches, blurry vision, chest pain, shortness of breath and right upper quadrant pain.   She reports good fetal movement. She denies contraction, vaginal bleeding and leakage of fluid.    O:    T(C): 36.9 (23 @ 04:08), Max: 37.2 (23 @ 11:59)  HR: 58 (23 @ 04:08) (58 - 90)  BP: 113/72 (23 @ 04:08) (112/60 - 132/78)  RR: 18 (23 @ 04:08) (18 - 18)  SpO2: 97% (23 @ 04:08) (96% - 99%)    PE:   General: Adult female in NAD  CVS: RRR, +S1/S2, no murmurs  Lungs: CTAB, no wheezing, rhonchi or rales  Abdomen: soft, non-tender, gravid uterus  Pelvic: Deferred  Ext: No cyanosis, edema or calf tenderness    LABS                        10.1   9.93  )-----------( 282      ( 2023 05:20 )             30.6         140  |  106  |  11.1  ----------------------------<  114<H>  4.3   |  22.0  |  0.41<L>    Ca    8.9      2023 05:20  Mg     5.7         TPro  6.5<L>  /  Alb  3.1<L>  /  TBili  <0.2<L>  /  DBili  x   /  AST  15  /  ALT  7   /  AlkPhos  122<H>
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
## 2023-02-21 NOTE — DISCHARGE NOTE NURSING/CASE MANAGEMENT/SOCIAL WORK - PATIENT PORTAL LINK FT
You can access the FollowMyHealth Patient Portal offered by [facility] by registering at the following website: [email or web address]. By joining [employer]’s FollowMyHealth portal, you will also be able to view your health information using other applications (apps) compatible with our system.

## 2023-02-21 NOTE — PROGRESS NOTE ADULT - PROBLEM SELECTOR PLAN 1
- Dated by LMP consistent with 1st trimester sonogram  - Continue PNV  - qShift NSTs
- 35w2d GA by LMP consistent with first trimester sono  - NST qshift: Patient has had reactive NST. No ctx on toco noted last night. Patient due for NST this AM.  - U/S on 1/12/23 showed vertex, anterior placenta, no previa. EFW: 1453g, 44%. Patient due for repeat GS @ 36 weeks.   - Regular diet  - continue PNV  - Patient completed ACS course of betamethasone 2/18 and 2/19 @ 2100.  - GC/CT negative. GBS swab collected and pending.

## 2023-02-24 ENCOUNTER — APPOINTMENT (OUTPATIENT)
Dept: ANTEPARTUM | Facility: CLINIC | Age: 33
End: 2023-02-24
Payer: MEDICAID

## 2023-02-24 ENCOUNTER — ASOB RESULT (OUTPATIENT)
Age: 33
End: 2023-02-24

## 2023-02-24 ENCOUNTER — APPOINTMENT (OUTPATIENT)
Dept: MATERNAL FETAL MEDICINE | Facility: CLINIC | Age: 33
End: 2023-02-24
Payer: MEDICAID

## 2023-02-24 VITALS
HEIGHT: 61 IN | HEART RATE: 67 BPM | SYSTOLIC BLOOD PRESSURE: 142 MMHG | OXYGEN SATURATION: 98 % | WEIGHT: 132 LBS | DIASTOLIC BLOOD PRESSURE: 92 MMHG | BODY MASS INDEX: 24.92 KG/M2 | RESPIRATION RATE: 16 BRPM

## 2023-02-24 DIAGNOSIS — O36.5930 MATERNAL CARE FOR OTHER KNOWN OR SUSPECTED POOR FETAL GROWTH, THIRD TRIMESTER, NOT APPLICABLE OR UNSPECIFIED: ICD-10-CM
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
## 2023-02-24 DIAGNOSIS — O10.013 PRE-EXISTING ESSENTIAL HYPERTENSION COMPLICATING PREGNANCY, THIRD TRIMESTER: ICD-10-CM

## 2023-02-24 DIAGNOSIS — Z3A.35 35 WEEKS GESTATION OF PREGNANCY: ICD-10-CM

## 2023-02-24 PROCEDURE — 76820 UMBILICAL ARTERY ECHO: CPT | Mod: 59

## 2023-02-24 PROCEDURE — 76816 OB US FOLLOW-UP PER FETUS: CPT

## 2023-02-24 PROCEDURE — ZZZZZ: CPT

## 2023-02-24 PROCEDURE — 99215 OFFICE O/P EST HI 40 MIN: CPT

## 2023-02-24 PROCEDURE — 76818 FETAL BIOPHYS PROFILE W/NST: CPT

## 2023-02-24 RX ORDER — PRENATAL VIT NO.126/IRON/FOLIC 28MG-0.8MG
28-0.8 TABLET ORAL
Refills: 0 | Status: ACTIVE | COMMUNITY

## 2023-02-24 RX ORDER — NIFEDIPINE 30 MG/1
30 TABLET, EXTENDED RELEASE ORAL DAILY
Qty: 30 | Refills: 2 | Status: ACTIVE | COMMUNITY
Start: 2023-02-24 | End: 1900-01-01

## 2023-02-24 NOTE — OB HISTORY
[LMP: ___] : LMP: [unfilled] [AARON: ___] : AARON: [unfilled] [EGA: ___ wks] : EGA: [unfilled] wks [Spontaneous] : Spontaneous conception [Sonogram] : sonogram [Normal Amount/Duration] : was of a normal amount and duration [Regular Cycle Intervals] : periods have been regular [___] : Living: [unfilled] [Pregnancy History] : patient did not receive anesthesia [FreeTextEntry1] : Patient hospitalized at [facility] on 2/19/2023 and discharged home on 2/21/2023.  She was seen by me during her hospitalization. [Spotting Between  Menses] : no spotting between menses

## 2023-02-24 NOTE — VITALS
[LMP (date): ___] : LMP was on [unfilled] [GA =___ Weeks] : which calculates to a GA of [unfilled] weeks [GA= ___ Days] : and [unfilled] day(s) [AARON by LMP (date): ___] : The calculated AARON by LMP is [unfilled]

## 2023-02-24 NOTE — RISK ASSESSMENT
[Pregnancy Complicated By HTN] : currently pregnancy complicated by HTN [Intrauterine Growth Restriction] : currently pregnancy complicated by Intrauterine Growth Restriction [Increased Blood Pressure] : 3rd trimester Increased Blood Pressure [] :  [(0) As much as I always could] : (0) No, not at all [(0) No, not at all] : (0) No, not at all
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
## 2023-03-02 ENCOUNTER — ASOB RESULT (OUTPATIENT)
Age: 33
End: 2023-03-02

## 2023-03-02 ENCOUNTER — APPOINTMENT (OUTPATIENT)
Dept: ANTEPARTUM | Facility: CLINIC | Age: 33
End: 2023-03-02
Payer: MEDICAID

## 2023-03-02 PROCEDURE — 76819 FETAL BIOPHYS PROFIL W/O NST: CPT

## 2023-03-02 PROCEDURE — 93976 VASCULAR STUDY: CPT

## 2023-03-02 PROCEDURE — 76820 UMBILICAL ARTERY ECHO: CPT

## 2023-03-05 ENCOUNTER — INPATIENT (INPATIENT)
Facility: HOSPITAL | Age: 33
LOS: 2 days | Discharge: HOME | End: 2023-03-08
Attending: OBSTETRICS & GYNECOLOGY | Admitting: OBSTETRICS & GYNECOLOGY
Payer: MEDICAID

## 2023-03-05 VITALS — DIASTOLIC BLOOD PRESSURE: 99 MMHG | TEMPERATURE: 98 F | HEART RATE: 77 BPM | SYSTOLIC BLOOD PRESSURE: 138 MMHG

## 2023-03-05 DIAGNOSIS — Z33.1 PREGNANT STATE, INCIDENTAL: ICD-10-CM

## 2023-03-05 DIAGNOSIS — Z90.49 ACQUIRED ABSENCE OF OTHER SPECIFIED PARTS OF DIGESTIVE TRACT: Chronic | ICD-10-CM

## 2023-03-05 LAB
ALBUMIN SERPL ELPH-MCNC: 3.3 G/DL — SIGNIFICANT CHANGE UP (ref 3.3–5.2)
ALBUMIN SERPL ELPH-MCNC: 3.5 G/DL — SIGNIFICANT CHANGE UP (ref 3.3–5.2)
ALP SERPL-CCNC: 155 U/L — HIGH (ref 40–120)
ALP SERPL-CCNC: 167 U/L — HIGH (ref 40–120)
ALT FLD-CCNC: 12 U/L — SIGNIFICANT CHANGE UP
ALT FLD-CCNC: 13 U/L — SIGNIFICANT CHANGE UP
ANION GAP SERPL CALC-SCNC: 13 MMOL/L — SIGNIFICANT CHANGE UP (ref 5–17)
ANION GAP SERPL CALC-SCNC: 14 MMOL/L — SIGNIFICANT CHANGE UP (ref 5–17)
APPEARANCE UR: ABNORMAL
APTT BLD: 24.6 SEC — LOW (ref 27.5–35.5)
AST SERPL-CCNC: 18 U/L — SIGNIFICANT CHANGE UP
AST SERPL-CCNC: 19 U/L — SIGNIFICANT CHANGE UP
BACTERIA # UR AUTO: ABNORMAL
BASOPHILS # BLD AUTO: 0.03 K/UL — SIGNIFICANT CHANGE UP (ref 0–0.2)
BASOPHILS NFR BLD AUTO: 0.4 % — SIGNIFICANT CHANGE UP (ref 0–2)
BILIRUB SERPL-MCNC: <0.2 MG/DL — LOW (ref 0.4–2)
BILIRUB UR-MCNC: NEGATIVE — SIGNIFICANT CHANGE UP
BLD GP AB SCN SERPL QL: SIGNIFICANT CHANGE UP
BUN SERPL-MCNC: 10.7 MG/DL — SIGNIFICANT CHANGE UP (ref 8–20)
BUN SERPL-MCNC: 8.6 MG/DL — SIGNIFICANT CHANGE UP (ref 8–20)
CALCIUM SERPL-MCNC: 8.7 MG/DL — SIGNIFICANT CHANGE UP (ref 8.4–10.5)
CALCIUM SERPL-MCNC: 8.8 MG/DL — SIGNIFICANT CHANGE UP (ref 8.4–10.5)
CHLORIDE SERPL-SCNC: 101 MMOL/L — SIGNIFICANT CHANGE UP (ref 96–108)
CHLORIDE SERPL-SCNC: 102 MMOL/L — SIGNIFICANT CHANGE UP (ref 96–108)
CO2 SERPL-SCNC: 22 MMOL/L — SIGNIFICANT CHANGE UP (ref 22–29)
COLOR SPEC: YELLOW — SIGNIFICANT CHANGE UP
CREAT ?TM UR-MCNC: 174 MG/DL — SIGNIFICANT CHANGE UP
CREAT SERPL-MCNC: 0.49 MG/DL — LOW (ref 0.5–1.3)
CREAT SERPL-MCNC: 0.55 MG/DL — SIGNIFICANT CHANGE UP (ref 0.5–1.3)
DIFF PNL FLD: NEGATIVE — SIGNIFICANT CHANGE UP
EGFR: 126 ML/MIN/1.73M2 — SIGNIFICANT CHANGE UP
EGFR: 129 ML/MIN/1.73M2 — SIGNIFICANT CHANGE UP
EOSINOPHIL # BLD AUTO: 0.02 K/UL — SIGNIFICANT CHANGE UP (ref 0–0.5)
EOSINOPHIL NFR BLD AUTO: 0.3 % — SIGNIFICANT CHANGE UP (ref 0–6)
EPI CELLS # UR: SIGNIFICANT CHANGE UP
FIBRINOGEN PPP-MCNC: 573 MG/DL — HIGH (ref 200–450)
GLUCOSE SERPL-MCNC: 101 MG/DL — HIGH (ref 70–99)
GLUCOSE SERPL-MCNC: 86 MG/DL — SIGNIFICANT CHANGE UP (ref 70–99)
GLUCOSE UR QL: NEGATIVE MG/DL — SIGNIFICANT CHANGE UP
HCT VFR BLD CALC: 34.1 % — LOW (ref 34.5–45)
HCT VFR BLD CALC: 34.3 % — LOW (ref 34.5–45)
HGB BLD-MCNC: 11.5 G/DL — SIGNIFICANT CHANGE UP (ref 11.5–15.5)
HGB BLD-MCNC: 11.7 G/DL — SIGNIFICANT CHANGE UP (ref 11.5–15.5)
IMM GRANULOCYTES NFR BLD AUTO: 0.4 % — SIGNIFICANT CHANGE UP (ref 0–0.9)
INR BLD: 0.86 RATIO — LOW (ref 0.88–1.16)
KETONES UR-MCNC: NEGATIVE — SIGNIFICANT CHANGE UP
LDH SERPL L TO P-CCNC: 178 U/L — SIGNIFICANT CHANGE UP (ref 98–192)
LEUKOCYTE ESTERASE UR-ACNC: ABNORMAL
LYMPHOCYTES # BLD AUTO: 1.72 K/UL — SIGNIFICANT CHANGE UP (ref 1–3.3)
LYMPHOCYTES # BLD AUTO: 24.9 % — SIGNIFICANT CHANGE UP (ref 13–44)
MAGNESIUM SERPL-MCNC: 4.7 MG/DL — HIGH (ref 1.6–2.6)
MCHC RBC-ENTMCNC: 30.2 PG — SIGNIFICANT CHANGE UP (ref 27–34)
MCHC RBC-ENTMCNC: 30.8 PG — SIGNIFICANT CHANGE UP (ref 27–34)
MCHC RBC-ENTMCNC: 33.7 GM/DL — SIGNIFICANT CHANGE UP (ref 32–36)
MCHC RBC-ENTMCNC: 34.1 GM/DL — SIGNIFICANT CHANGE UP (ref 32–36)
MCV RBC AUTO: 89.5 FL — SIGNIFICANT CHANGE UP (ref 80–100)
MCV RBC AUTO: 90.3 FL — SIGNIFICANT CHANGE UP (ref 80–100)
MONOCYTES # BLD AUTO: 0.34 K/UL — SIGNIFICANT CHANGE UP (ref 0–0.9)
MONOCYTES NFR BLD AUTO: 4.9 % — SIGNIFICANT CHANGE UP (ref 2–14)
NEUTROPHILS # BLD AUTO: 4.77 K/UL — SIGNIFICANT CHANGE UP (ref 1.8–7.4)
NEUTROPHILS NFR BLD AUTO: 69.1 % — SIGNIFICANT CHANGE UP (ref 43–77)
NITRITE UR-MCNC: NEGATIVE — SIGNIFICANT CHANGE UP
PH UR: 7 — SIGNIFICANT CHANGE UP (ref 5–8)
PLATELET # BLD AUTO: 243 K/UL — SIGNIFICANT CHANGE UP (ref 150–400)
PLATELET # BLD AUTO: 282 K/UL — SIGNIFICANT CHANGE UP (ref 150–400)
POTASSIUM SERPL-MCNC: 3.9 MMOL/L — SIGNIFICANT CHANGE UP (ref 3.5–5.3)
POTASSIUM SERPL-MCNC: 4 MMOL/L — SIGNIFICANT CHANGE UP (ref 3.5–5.3)
POTASSIUM SERPL-SCNC: 3.9 MMOL/L — SIGNIFICANT CHANGE UP (ref 3.5–5.3)
POTASSIUM SERPL-SCNC: 4 MMOL/L — SIGNIFICANT CHANGE UP (ref 3.5–5.3)
PROT ?TM UR-MCNC: 37 MG/DL — HIGH (ref 0–12)
PROT SERPL-MCNC: 6.4 G/DL — LOW (ref 6.6–8.7)
PROT SERPL-MCNC: 6.9 G/DL — SIGNIFICANT CHANGE UP (ref 6.6–8.7)
PROT UR-MCNC: 30 MG/DL
PROT/CREAT UR-RTO: 0.2 RATIO — SIGNIFICANT CHANGE UP
PROTHROM AB SERPL-ACNC: 10 SEC — LOW (ref 10.5–13.4)
RBC # BLD: 3.8 M/UL — SIGNIFICANT CHANGE UP (ref 3.8–5.2)
RBC # BLD: 3.81 M/UL — SIGNIFICANT CHANGE UP (ref 3.8–5.2)
RBC # FLD: 13.2 % — SIGNIFICANT CHANGE UP (ref 10.3–14.5)
RBC # FLD: 13.3 % — SIGNIFICANT CHANGE UP (ref 10.3–14.5)
RBC CASTS # UR COMP ASSIST: NEGATIVE /HPF — SIGNIFICANT CHANGE UP (ref 0–4)
SARS-COV-2 RNA SPEC QL NAA+PROBE: SIGNIFICANT CHANGE UP
SODIUM SERPL-SCNC: 135 MMOL/L — SIGNIFICANT CHANGE UP (ref 135–145)
SODIUM SERPL-SCNC: 138 MMOL/L — SIGNIFICANT CHANGE UP (ref 135–145)
SP GR SPEC: 1.01 — SIGNIFICANT CHANGE UP (ref 1.01–1.02)
URATE SERPL-MCNC: 6.1 MG/DL — HIGH (ref 2.4–5.7)
UROBILINOGEN FLD QL: NEGATIVE MG/DL — SIGNIFICANT CHANGE UP
WBC # BLD: 11.44 K/UL — HIGH (ref 3.8–10.5)
WBC # BLD: 6.91 K/UL — SIGNIFICANT CHANGE UP (ref 3.8–10.5)
WBC # FLD AUTO: 11.44 K/UL — HIGH (ref 3.8–10.5)
WBC # FLD AUTO: 6.91 K/UL — SIGNIFICANT CHANGE UP (ref 3.8–10.5)
WBC UR QL: SIGNIFICANT CHANGE UP /HPF (ref 0–5)

## 2023-03-05 PROCEDURE — 88307 TISSUE EXAM BY PATHOLOGIST: CPT | Mod: 26

## 2023-03-05 RX ORDER — MAGNESIUM SULFATE 500 MG/ML
4 VIAL (ML) INJECTION ONCE
Refills: 0 | Status: DISCONTINUED | OUTPATIENT
Start: 2023-03-05 | End: 2023-03-05

## 2023-03-05 RX ORDER — OXYTOCIN 10 UNIT/ML
333.33 VIAL (ML) INJECTION
Qty: 20 | Refills: 0 | Status: COMPLETED | OUTPATIENT
Start: 2023-03-05 | End: 2023-03-05

## 2023-03-05 RX ORDER — DIBUCAINE 1 %
1 OINTMENT (GRAM) RECTAL EVERY 6 HOURS
Refills: 0 | Status: DISCONTINUED | OUTPATIENT
Start: 2023-03-05 | End: 2023-03-08

## 2023-03-05 RX ORDER — HYDRALAZINE HCL 50 MG
10 TABLET ORAL ONCE
Refills: 0 | Status: DISCONTINUED | OUTPATIENT
Start: 2023-03-05 | End: 2023-03-05

## 2023-03-05 RX ORDER — HYDRALAZINE HCL 50 MG
5 TABLET ORAL ONCE
Refills: 0 | Status: COMPLETED | OUTPATIENT
Start: 2023-03-05 | End: 2023-03-05

## 2023-03-05 RX ORDER — MAGNESIUM SULFATE 500 MG/ML
2 VIAL (ML) INJECTION
Qty: 40 | Refills: 0 | Status: DISCONTINUED | OUTPATIENT
Start: 2023-03-05 | End: 2023-03-05

## 2023-03-05 RX ORDER — MAGNESIUM SULFATE 500 MG/ML
2 VIAL (ML) INJECTION
Qty: 40 | Refills: 0 | Status: DISCONTINUED | OUTPATIENT
Start: 2023-03-05 | End: 2023-03-06

## 2023-03-05 RX ORDER — DIPHENHYDRAMINE HCL 50 MG
25 CAPSULE ORAL EVERY 6 HOURS
Refills: 0 | Status: DISCONTINUED | OUTPATIENT
Start: 2023-03-05 | End: 2023-03-08

## 2023-03-05 RX ORDER — NIFEDIPINE 30 MG
60 TABLET, EXTENDED RELEASE 24 HR ORAL DAILY
Refills: 0 | Status: DISCONTINUED | OUTPATIENT
Start: 2023-03-06 | End: 2023-03-07

## 2023-03-05 RX ORDER — CITRIC ACID/SODIUM CITRATE 300-500 MG
30 SOLUTION, ORAL ORAL ONCE
Refills: 0 | Status: DISCONTINUED | OUTPATIENT
Start: 2023-03-05 | End: 2023-03-05

## 2023-03-05 RX ORDER — MAGNESIUM HYDROXIDE 400 MG/1
30 TABLET, CHEWABLE ORAL
Refills: 0 | Status: DISCONTINUED | OUTPATIENT
Start: 2023-03-05 | End: 2023-03-08

## 2023-03-05 RX ORDER — HYDROCORTISONE 1 %
1 OINTMENT (GRAM) TOPICAL EVERY 6 HOURS
Refills: 0 | Status: DISCONTINUED | OUTPATIENT
Start: 2023-03-05 | End: 2023-03-08

## 2023-03-05 RX ORDER — NIFEDIPINE 30 MG
30 TABLET, EXTENDED RELEASE 24 HR ORAL ONCE
Refills: 0 | Status: DISCONTINUED | OUTPATIENT
Start: 2023-03-05 | End: 2023-03-05

## 2023-03-05 RX ORDER — BENZOCAINE 10 %
1 GEL (GRAM) MUCOUS MEMBRANE EVERY 6 HOURS
Refills: 0 | Status: DISCONTINUED | OUTPATIENT
Start: 2023-03-05 | End: 2023-03-08

## 2023-03-05 RX ORDER — SIMETHICONE 80 MG/1
80 TABLET, CHEWABLE ORAL EVERY 4 HOURS
Refills: 0 | Status: DISCONTINUED | OUTPATIENT
Start: 2023-03-05 | End: 2023-03-08

## 2023-03-05 RX ORDER — TETANUS TOXOID, REDUCED DIPHTHERIA TOXOID AND ACELLULAR PERTUSSIS VACCINE, ADSORBED 5; 2.5; 8; 8; 2.5 [IU]/.5ML; [IU]/.5ML; UG/.5ML; UG/.5ML; UG/.5ML
0.5 SUSPENSION INTRAMUSCULAR ONCE
Refills: 0 | Status: DISCONTINUED | OUTPATIENT
Start: 2023-03-05 | End: 2023-03-08

## 2023-03-05 RX ORDER — IBUPROFEN 200 MG
600 TABLET ORAL EVERY 6 HOURS
Refills: 0 | Status: COMPLETED | OUTPATIENT
Start: 2023-03-05 | End: 2024-02-01

## 2023-03-05 RX ORDER — LANOLIN
1 OINTMENT (GRAM) TOPICAL EVERY 6 HOURS
Refills: 0 | Status: DISCONTINUED | OUTPATIENT
Start: 2023-03-05 | End: 2023-03-08

## 2023-03-05 RX ORDER — SODIUM CHLORIDE 9 MG/ML
3 INJECTION INTRAMUSCULAR; INTRAVENOUS; SUBCUTANEOUS EVERY 8 HOURS
Refills: 0 | Status: DISCONTINUED | OUTPATIENT
Start: 2023-03-05 | End: 2023-03-08

## 2023-03-05 RX ORDER — PRAMOXINE HYDROCHLORIDE 150 MG/15G
1 AEROSOL, FOAM RECTAL EVERY 4 HOURS
Refills: 0 | Status: DISCONTINUED | OUTPATIENT
Start: 2023-03-05 | End: 2023-03-08

## 2023-03-05 RX ORDER — NIFEDIPINE 30 MG
30 TABLET, EXTENDED RELEASE 24 HR ORAL EVERY 24 HOURS
Refills: 0 | Status: DISCONTINUED | OUTPATIENT
Start: 2023-03-05 | End: 2023-03-05

## 2023-03-05 RX ORDER — ACETAMINOPHEN 500 MG
975 TABLET ORAL
Refills: 0 | Status: DISCONTINUED | OUTPATIENT
Start: 2023-03-05 | End: 2023-03-08

## 2023-03-05 RX ORDER — CHLORHEXIDINE GLUCONATE 213 G/1000ML
1 SOLUTION TOPICAL ONCE
Refills: 0 | Status: DISCONTINUED | OUTPATIENT
Start: 2023-03-05 | End: 2023-03-05

## 2023-03-05 RX ORDER — OXYTOCIN 10 UNIT/ML
41.67 VIAL (ML) INJECTION
Qty: 20 | Refills: 0 | Status: DISCONTINUED | OUTPATIENT
Start: 2023-03-05 | End: 2023-03-08

## 2023-03-05 RX ORDER — OXYCODONE HYDROCHLORIDE 5 MG/1
5 TABLET ORAL
Refills: 0 | Status: DISCONTINUED | OUTPATIENT
Start: 2023-03-05 | End: 2023-03-08

## 2023-03-05 RX ORDER — MAGNESIUM SULFATE 500 MG/ML
4 VIAL (ML) INJECTION ONCE
Refills: 0 | Status: COMPLETED | OUTPATIENT
Start: 2023-03-05 | End: 2023-03-05

## 2023-03-05 RX ORDER — SODIUM CHLORIDE 9 MG/ML
1000 INJECTION, SOLUTION INTRAVENOUS
Refills: 0 | Status: DISCONTINUED | OUTPATIENT
Start: 2023-03-05 | End: 2023-03-05

## 2023-03-05 RX ORDER — NIFEDIPINE 30 MG
30 TABLET, EXTENDED RELEASE 24 HR ORAL ONCE
Refills: 0 | Status: COMPLETED | OUTPATIENT
Start: 2023-03-05 | End: 2023-03-05

## 2023-03-05 RX ORDER — OXYCODONE HYDROCHLORIDE 5 MG/1
5 TABLET ORAL ONCE
Refills: 0 | Status: DISCONTINUED | OUTPATIENT
Start: 2023-03-05 | End: 2023-03-08

## 2023-03-05 RX ORDER — KETOROLAC TROMETHAMINE 30 MG/ML
30 SYRINGE (ML) INJECTION ONCE
Refills: 0 | Status: DISCONTINUED | OUTPATIENT
Start: 2023-03-05 | End: 2023-03-05

## 2023-03-05 RX ORDER — AER TRAVELER 0.5 G/1
1 SOLUTION RECTAL; TOPICAL EVERY 4 HOURS
Refills: 0 | Status: DISCONTINUED | OUTPATIENT
Start: 2023-03-05 | End: 2023-03-08

## 2023-03-05 RX ORDER — IBUPROFEN 200 MG
600 TABLET ORAL EVERY 6 HOURS
Refills: 0 | Status: DISCONTINUED | OUTPATIENT
Start: 2023-03-05 | End: 2023-03-08

## 2023-03-05 RX ADMIN — Medication 30 MILLIGRAM(S): at 09:43

## 2023-03-05 RX ADMIN — Medication 600 MILLIGRAM(S): at 23:31

## 2023-03-05 RX ADMIN — Medication 50 GM/HR: at 16:53

## 2023-03-05 RX ADMIN — Medication 300 GRAM(S): at 16:27

## 2023-03-05 RX ADMIN — Medication 30 MILLIGRAM(S): at 16:05

## 2023-03-05 RX ADMIN — SODIUM CHLORIDE 125 MILLILITER(S): 9 INJECTION, SOLUTION INTRAVENOUS at 14:53

## 2023-03-05 RX ADMIN — SODIUM CHLORIDE 75 MILLILITER(S): 9 INJECTION, SOLUTION INTRAVENOUS at 19:05

## 2023-03-05 RX ADMIN — SODIUM CHLORIDE 125 MILLILITER(S): 9 INJECTION, SOLUTION INTRAVENOUS at 13:28

## 2023-03-05 RX ADMIN — Medication 5 MILLIGRAM(S): at 16:14

## 2023-03-05 RX ADMIN — Medication 30 MILLIGRAM(S): at 16:20

## 2023-03-05 RX ADMIN — Medication 30 MILLIGRAM(S): at 17:42

## 2023-03-05 RX ADMIN — Medication 1000 MILLIUNIT(S)/MIN: at 16:33

## 2023-03-05 RX ADMIN — Medication 975 MILLIGRAM(S): at 21:35

## 2023-03-05 NOTE — OB RN PATIENT PROFILE - FUNCTIONAL ASSESSMENT - BASIC MOBILITY 6.
4-calculated by average/Not able to assess (calculate score using [facility] averaging method)

## 2023-03-05 NOTE — OB PROVIDER DELIVERY SUMMARY - NSPROVIDERDELIVERYNOTE_OBGYN_ALL_OB_FT
Patient felt rectal pressure and was found to be fully dilated, +2 station.  She pushed effectively for 2 minutes.  Patient prepped and draped for delivery.  In conjunction with maternal effort, she delivered a viable infant   Head delivered OA   No nuchal cord.   shoulders and body then delivered without difficulty.  cord clamped x2 and cut.   Placenta delivered spontaneously and intact, No gross pathology, 3 vessel cord. Excellent hemostasis was achieved.  Perineum and vagina were inspected and no perineal lac was noted  Excellent hemostasis obtained, EBL 35cc, no complications.    [place]: Apgars 9/9, awaiting weight

## 2023-03-05 NOTE — OB PROVIDER DELIVERY SUMMARY - NSSELHIDDEN_OBGYN_ALL_OB_FT
[NS_DeliveryAttending2_OBGYN_ALL_OB_FT:[id number]==],[NS_DeliveryAssist1_OBGYN_ALL_OB_FT:[id number]=],[NS_DeliveryAttending1_OBGYN_ALL_OB_FT:[id number]==]

## 2023-03-05 NOTE — OB PROVIDER DELIVERY SUMMARY - NSLABORDELIVCOMPPROBLEMSOTHER_OBGYN_ALL_OB_FT
severe range BP x1 while flexing arm right before delivery and trying to receive epidural; will continue to monitor

## 2023-03-05 NOTE — OB RN DELIVERY SUMMARY - NSSELHIDDEN_OBGYN_ALL_OB_FT
[NS_DeliveryAttending1_OBGYN_ALL_OB_FT:[id number]==],[NS_DeliveryAssist1_OBGYN_ALL_OB_FT:[id number]=],[NS_DeliveryRN_OBGYN_ALL_OB_FT:[id number]=]

## 2023-03-05 NOTE — OB PROVIDER H&P - ASSESSMENT
32y  at 37w GA by LMP consistent with 2nd trimester sono   induction of labor for hypertensive disorder in pregnancy at term    #hypertensive disorder  -chronic hypertension  -taking procardia 30  -PIH labs sent today  -denies signs/symptoms of PEC  -previously ACS complete on   -MgSO4 had been started at [facility] on previous admission given concern for preeclampsia, however, clinical picture had been more consistent with chronic HTN exacerbation    #37 weeks  -IOL for chronic hypertension  -buccal cytotec, will reevaluate for switch to pitocin    discussed with attending Dr David

## 2023-03-05 NOTE — OB PROVIDER LABOR PROGRESS NOTE - ASSESSMENT
Cat I tracing primarily, resuscitating / repositioning after AROM  progressing in labor  q1-2 minute   requesting epidural  will reevaluate for need for augmentation    discussed with attending Dr David

## 2023-03-05 NOTE — OB PROVIDER H&P - HISTORY OF PRESENT ILLNESS
32y  at 37w GA by LMP consistent with 2nd trimester sono who presents to L&D for induction of labor at 37w0d in setting of known chronic hypertension  She has been followed by MFM closely and received  steroid course.   Patient denies vaginal bleeding, contractions and leakage of fluid. She endorses good fetal movement.   Denies fevers, chills, nausea, vomiting, chest pain, SOB, dizziness. No other complaints at this time.              AARON: 3/26/2023  LMP: 2022     Prenatal course is significant for:  chronic hypertension; diagnosed with preeclampsia without severe features on last admission; on procardia 30  FGR  Low lying placenta, now resolved  Failed 1hr glucose testing, followed by normal 3hr glucose testing     POB: NSVDx1 ()  PGYN: -fibroids, -ovarian cysts, denies STD hx, denies abnormal PAPs   PMH: anema  PSH: cholecystectomy, open  SH: Denies EtOH, tobacco and illicit drug use during this pregnancy; feels safe at home   Meds: PNVs  Allergies: NKDA    BMI: 25  Sono: vertex, anterior placenta

## 2023-03-05 NOTE — OB PROVIDER H&P - NSLOWPPHRISK_OBGYN_A_OB
No previous uterine incision/[place] Pregnancy/Less than or equal to 4 previous vaginal births/No known bleeding disorder/No history of postpartum hemorrhage/No other PPH risks indicated

## 2023-03-05 NOTE — CHART NOTE - NSCHARTNOTEFT_GEN_A_CORE
Patient evaluated in setting of severe range BPs shortly after delivery  patient asymptomatic  166/113 at 1504    subsequently  171/102 @1558  163/106 @1604    hydralazine 5 IVP given    171/107 @1609  162/101 @1614    will continue to monitor  magnesium sulfate started for cHTN now with postpartum preeclampsia  will uptitrate PO antihypertensive

## 2023-03-06 ENCOUNTER — APPOINTMENT (OUTPATIENT)
Dept: ANTEPARTUM | Facility: CLINIC | Age: 33
End: 2023-03-06

## 2023-03-06 ENCOUNTER — APPOINTMENT (OUTPATIENT)
Dept: MATERNAL FETAL MEDICINE | Facility: CLINIC | Age: 33
End: 2023-03-06

## 2023-03-06 LAB
ALBUMIN SERPL ELPH-MCNC: 3.2 G/DL — LOW (ref 3.3–5.2)
ALP SERPL-CCNC: 144 U/L — HIGH (ref 40–120)
ALT FLD-CCNC: 12 U/L — SIGNIFICANT CHANGE UP
ANION GAP SERPL CALC-SCNC: 13 MMOL/L — SIGNIFICANT CHANGE UP (ref 5–17)
AST SERPL-CCNC: 20 U/L — SIGNIFICANT CHANGE UP
BILIRUB SERPL-MCNC: <0.2 MG/DL — LOW (ref 0.4–2)
BUN SERPL-MCNC: 5.1 MG/DL — LOW (ref 8–20)
CALCIUM SERPL-MCNC: 7.2 MG/DL — LOW (ref 8.4–10.5)
CHLORIDE SERPL-SCNC: 103 MMOL/L — SIGNIFICANT CHANGE UP (ref 96–108)
CO2 SERPL-SCNC: 23 MMOL/L — SIGNIFICANT CHANGE UP (ref 22–29)
CREAT SERPL-MCNC: 0.44 MG/DL — LOW (ref 0.5–1.3)
EGFR: 133 ML/MIN/1.73M2 — SIGNIFICANT CHANGE UP
GLUCOSE SERPL-MCNC: 81 MG/DL — SIGNIFICANT CHANGE UP (ref 70–99)
HCT VFR BLD CALC: 35 % — SIGNIFICANT CHANGE UP (ref 34.5–45)
HGB BLD-MCNC: 11.7 G/DL — SIGNIFICANT CHANGE UP (ref 11.5–15.5)
MAGNESIUM SERPL-MCNC: 5.4 MG/DL — HIGH (ref 1.6–2.6)
MAGNESIUM SERPL-MCNC: 6 MG/DL — HIGH (ref 1.6–2.6)
MCHC RBC-ENTMCNC: 30.3 PG — SIGNIFICANT CHANGE UP (ref 27–34)
MCHC RBC-ENTMCNC: 33.4 GM/DL — SIGNIFICANT CHANGE UP (ref 32–36)
MCV RBC AUTO: 90.7 FL — SIGNIFICANT CHANGE UP (ref 80–100)
PLATELET # BLD AUTO: 252 K/UL — SIGNIFICANT CHANGE UP (ref 150–400)
POTASSIUM SERPL-MCNC: 3.8 MMOL/L — SIGNIFICANT CHANGE UP (ref 3.5–5.3)
POTASSIUM SERPL-SCNC: 3.8 MMOL/L — SIGNIFICANT CHANGE UP (ref 3.5–5.3)
PROT SERPL-MCNC: 6.2 G/DL — LOW (ref 6.6–8.7)
RBC # BLD: 3.86 M/UL — SIGNIFICANT CHANGE UP (ref 3.8–5.2)
RBC # FLD: 13.3 % — SIGNIFICANT CHANGE UP (ref 10.3–14.5)
SODIUM SERPL-SCNC: 139 MMOL/L — SIGNIFICANT CHANGE UP (ref 135–145)
T PALLIDUM AB TITR SER: NEGATIVE — SIGNIFICANT CHANGE UP
WBC # BLD: 13.27 K/UL — HIGH (ref 3.8–10.5)
WBC # FLD AUTO: 13.27 K/UL — HIGH (ref 3.8–10.5)

## 2023-03-06 RX ADMIN — Medication 600 MILLIGRAM(S): at 17:28

## 2023-03-06 RX ADMIN — SODIUM CHLORIDE 3 MILLILITER(S): 9 INJECTION INTRAMUSCULAR; INTRAVENOUS; SUBCUTANEOUS at 13:47

## 2023-03-06 RX ADMIN — Medication 975 MILLIGRAM(S): at 20:31

## 2023-03-06 RX ADMIN — Medication 600 MILLIGRAM(S): at 11:56

## 2023-03-06 RX ADMIN — Medication 975 MILLIGRAM(S): at 09:49

## 2023-03-06 RX ADMIN — Medication 50 GM/HR: at 13:33

## 2023-03-06 RX ADMIN — Medication 600 MILLIGRAM(S): at 18:29

## 2023-03-06 RX ADMIN — Medication 975 MILLIGRAM(S): at 15:08

## 2023-03-06 RX ADMIN — Medication 60 MILLIGRAM(S): at 05:14

## 2023-03-06 RX ADMIN — Medication 975 MILLIGRAM(S): at 10:34

## 2023-03-06 RX ADMIN — Medication 600 MILLIGRAM(S): at 05:13

## 2023-03-06 RX ADMIN — Medication 600 MILLIGRAM(S): at 00:00

## 2023-03-06 RX ADMIN — Medication 600 MILLIGRAM(S): at 23:54

## 2023-03-06 RX ADMIN — MAGNESIUM HYDROXIDE 30 MILLILITER(S): 400 TABLET, CHEWABLE ORAL at 17:29

## 2023-03-06 RX ADMIN — Medication 1 TABLET(S): at 11:56

## 2023-03-06 RX ADMIN — Medication 975 MILLIGRAM(S): at 14:02

## 2023-03-07 ENCOUNTER — TRANSCRIPTION ENCOUNTER (OUTPATIENT)
Age: 33
End: 2023-03-07

## 2023-03-07 RX ORDER — IBUPROFEN 200 MG
1 TABLET ORAL
Qty: 12 | Refills: 0
Start: 2023-03-07 | End: 2023-03-09

## 2023-03-07 RX ORDER — NIFEDIPINE 30 MG
60 TABLET, EXTENDED RELEASE 24 HR ORAL DAILY
Refills: 0 | Status: DISCONTINUED | OUTPATIENT
Start: 2023-03-07 | End: 2023-03-07

## 2023-03-07 RX ORDER — NIFEDIPINE 30 MG
30 TABLET, EXTENDED RELEASE 24 HR ORAL ONCE
Refills: 0 | Status: COMPLETED | OUTPATIENT
Start: 2023-03-07 | End: 2023-03-07

## 2023-03-07 RX ORDER — NIFEDIPINE 30 MG
1 TABLET, EXTENDED RELEASE 24 HR ORAL
Qty: 30 | Refills: 0
Start: 2023-03-07 | End: 2023-04-05

## 2023-03-07 RX ORDER — NIFEDIPINE 30 MG
90 TABLET, EXTENDED RELEASE 24 HR ORAL EVERY 24 HOURS
Refills: 0 | Status: DISCONTINUED | OUTPATIENT
Start: 2023-03-08 | End: 2023-03-08

## 2023-03-07 RX ORDER — ACETAMINOPHEN 500 MG
2 TABLET ORAL
Qty: 24 | Refills: 0
Start: 2023-03-07 | End: 2023-03-09

## 2023-03-07 RX ADMIN — Medication 600 MILLIGRAM(S): at 12:00

## 2023-03-07 RX ADMIN — Medication 1 TABLET(S): at 11:59

## 2023-03-07 RX ADMIN — Medication 975 MILLIGRAM(S): at 15:00

## 2023-03-07 RX ADMIN — Medication 60 MILLIGRAM(S): at 05:52

## 2023-03-07 RX ADMIN — Medication 600 MILLIGRAM(S): at 05:52

## 2023-03-07 RX ADMIN — Medication 975 MILLIGRAM(S): at 14:45

## 2023-03-07 RX ADMIN — Medication 600 MILLIGRAM(S): at 23:27

## 2023-03-07 RX ADMIN — Medication 975 MILLIGRAM(S): at 20:57

## 2023-03-07 RX ADMIN — Medication 600 MILLIGRAM(S): at 11:59

## 2023-03-07 RX ADMIN — SODIUM CHLORIDE 3 MILLILITER(S): 9 INJECTION INTRAMUSCULAR; INTRAVENOUS; SUBCUTANEOUS at 17:40

## 2023-03-07 RX ADMIN — Medication 30 MILLIGRAM(S): at 08:28

## 2023-03-07 RX ADMIN — Medication 975 MILLIGRAM(S): at 09:00

## 2023-03-07 RX ADMIN — Medication 975 MILLIGRAM(S): at 08:28

## 2023-03-07 NOTE — DISCHARGE NOTE OB - PATIENT PORTAL LINK FT
You can access the FollowMyHealth Patient Portal offered by  by registering at the following website: [email or web address]. By joining [facility] FollowMyHealth portal, you will also be able to view your health information using other applications (apps) compatible with our system.

## 2023-03-07 NOTE — DISCHARGE NOTE OB - PLAN OF CARE
Patient underwent a normal spontaneous vaginal delivery. Post-partum course was uncomplicated. Pain is well controlled with PRN medication. She has no difficulty with ambulation, voiding, or PO intake. Lab values and vital signs are within normal limits prior to discharge.  1) Please take acetaminophen and ibuprofen as needed for pain as prescribed.  2) Nothing in the vagina for 6 weeks (including no sex, no tampons, and no douching).  3) Please call your doctor for a follow up postpartum appointment in 4-6 weeks.  4) Please continue taking vitamins postpartum. Take iron and colace for acute blood loss anemia.  5) Please call the office sooner if you have heavy vaginal bleeding, severe abdominal pain, or fever > 100.4F.  6) You may resume regular daily activity as tolerated You had chronic hypertension with superimposed preeclampsia during your pregnancy. Continue to take Procardia 60XL once a day. Take blood pressures twice - once in AM and once in PM. Please monitor for blood pressures >140/>90, headaches, visual disturbances, RUQ pain, epigastric pain and new-onset edema - call your doctor if you have any of these. You had chronic hypertension with superimposed preeclampsia during your pregnancy. Continue to take Procardia 90XL once a day. Take blood pressures twice - once in AM and once in PM. Please monitor for blood pressures >140/>90, headaches, visual disturbances, RUQ pain, epigastric pain and new-onset edema - call your doctor if you have any of these.

## 2023-03-07 NOTE — DISCHARGE NOTE OB - NS MD DC FALL RISK RISK
For information on Fall & Injury Prevention, visit: [email or web address] OR  [email or web address] OR  https://www.cdc.gov/steadi/patient.html

## 2023-03-07 NOTE — DISCHARGE NOTE OB - MEDICATION SUMMARY - MEDICATIONS TO STOP TAKING
I will STOP taking the medications listed below when I get home from the hospital:  None I will STOP taking the medications listed below when I get home from the hospital:    Procardia XL 30 mg oral tablet, extended release  -- 1 tab(s) by mouth once a day

## 2023-03-07 NOTE — DISCHARGE NOTE OB - CARE PROVIDER_API CALL
Johanna Castillo)  Obstetrics and Gynecology  [address] [place] [address]  Phone: [phone]  Fax: [phone]  Follow Up Time: 2 weeks

## 2023-03-07 NOTE — DISCHARGE NOTE OB - MEDICATION SUMMARY - MEDICATIONS TO TAKE
I will START or STAY ON the medications listed below when I get home from the hospital:    acetaminophen 500 mg oral tablet  -- 2 tab(s) by mouth every 6 hours   -- This product contains acetaminophen.  Do not use  with any other product containing acetaminophen to prevent possible liver damage.    -- Indication: For Pain    ibuprofen 600 mg oral tablet  -- 1 tab(s) by mouth every 6 hours   -- Do not take this drug if you are pregnant.  It is very important that you take or use this exactly as directed.  Do not skip doses or discontinue unless directed by your doctor.  May cause drowsiness or dizziness.  Obtain medical advice before taking any non-prescription drugs as some may affect the action of this medication.  Take with food or milk.    -- Indication: For Pain    Procardia XL 60 mg oral tablet, extended release  -- 1 tab(s) by mouth once a day   -- Avoid grapefruit and grapefruit juice while taking this medication.  It is very important that you take or use this exactly as directed.  Do not skip doses or discontinue unless directed by your doctor.  Some non-prescription drugs may aggravate your condition.  Read all labels carefully.  If a warning appears, check with your doctor before taking.  Swallow whole.  Do not crush.    -- Indication: For Chronic hypertension with superimposed preeclampsia    Prenatal Multivitamins with Folic Acid 1 mg oral capsule  -- 1 cap(s) by mouth once a day   -- Indication: For healthy mom and baby   I will START or STAY ON the medications listed below when I get home from the hospital:    acetaminophen 500 mg oral tablet  -- 2 tab(s) by mouth every 6 hours   -- This product contains acetaminophen.  Do not use  with any other product containing acetaminophen to prevent possible liver damage.    -- Indication: For Pain    ibuprofen 600 mg oral tablet  -- 1 tab(s) by mouth every 6 hours   -- Do not take this drug if you are pregnant.  It is very important that you take or use this exactly as directed.  Do not skip doses or discontinue unless directed by your doctor.  May cause drowsiness or dizziness.  Obtain medical advice before taking any non-prescription drugs as some may affect the action of this medication.  Take with food or milk.    -- Indication: For Pain    Procardia XL 90 mg oral tablet, extended release  -- 1 tab(s) by mouth once a day   -- Avoid grapefruit and grapefruit juice while taking this medication.  It is very important that you take or use this exactly as directed.  Do not skip doses or discontinue unless directed by your doctor.  Some non-prescription drugs may aggravate your condition.  Read all labels carefully.  If a warning appears, check with your doctor before taking.  Swallow whole.  Do not crush.    -- Indication: For Chronic hypertension superimposed preeclampsia    Prenatal Multivitamins with Folic Acid 1 mg oral capsule  -- 1 cap(s) by mouth once a day   -- Indication: For healthy mom and baby   I will START or STAY ON the medications listed below when I get home from the hospital:    acetaminophen 500 mg oral tablet  -- 2 tab(s) by mouth every 6 hours   -- This product contains acetaminophen.  Do not use  with any other product containing acetaminophen to prevent possible liver damage.    -- Indication: For Pain    ibuprofen 600 mg oral tablet  -- 1 tab(s) by mouth every 6 hours   -- Do not take this drug if you are pregnant.  It is very important that you take or use this exactly as directed.  Do not skip doses or discontinue unless directed by your doctor.  May cause drowsiness or dizziness.  Obtain medical advice before taking any non-prescription drugs as some may affect the action of this medication.  Take with food or milk.    -- Indication: For Pain    labetalol 200 mg oral tablet  -- 1 tab(s) by mouth 2 times a day   -- It is very important that you take or use this exactly as directed.  Do not skip doses or discontinue unless directed by your doctor.  May cause drowsiness or dizziness.  Some non-prescription drugs may aggravate your condition.  Read all labels carefully.  If a warning appears, check with your doctor before taking.    -- Indication: For blood pressure    Procardia XL 90 mg oral tablet, extended release  -- 1 tab(s) by mouth once a day   -- Avoid grapefruit and grapefruit juice while taking this medication.  It is very important that you take or use this exactly as directed.  Do not skip doses or discontinue unless directed by your doctor.  Some non-prescription drugs may aggravate your condition.  Read all labels carefully.  If a warning appears, check with your doctor before taking.  Swallow whole.  Do not crush.    -- Indication: For blood pressure    Prenatal Multivitamins with Folic Acid 1 mg oral capsule  -- 1 cap(s) by mouth once a day   -- Indication: For healthy mom and baby   I will START or STAY ON the medications listed below when I get home from the hospital:    acetaminophen 500 mg oral tablet  -- 2 tab(s) by mouth every 6 hours   -- This product contains acetaminophen.  Do not use  with any other product containing acetaminophen to prevent possible liver damage.    -- Indication: For Pain    ibuprofen 600 mg oral tablet  -- 1 tab(s) by mouth every 6 hours   -- Do not take this drug if you are pregnant.  It is very important that you take or use this exactly as directed.  Do not skip doses or discontinue unless directed by your doctor.  May cause drowsiness or dizziness.  Obtain medical advice before taking any non-prescription drugs as some may affect the action of this medication.  Take with food or milk.    -- Indication: For Pain    Procardia XL 90 mg oral tablet, extended release  -- 1 tab(s) by mouth once a day   -- Avoid grapefruit and grapefruit juice while taking this medication.  It is very important that you take or use this exactly as directed.  Do not skip doses or discontinue unless directed by your doctor.  Some non-prescription drugs may aggravate your condition.  Read all labels carefully.  If a warning appears, check with your doctor before taking.  Swallow whole.  Do not crush.    -- Indication: For blood pressure    Prenatal Multivitamins with Folic Acid 1 mg oral capsule  -- 1 cap(s) by mouth once a day   -- Indication: For healthy mom and baby

## 2023-03-07 NOTE — DISCHARGE NOTE OB - CARE PLAN
1 Principal Discharge DX:	 (normal spontaneous vaginal delivery)  Assessment and plan of treatment:	Patient underwent a normal spontaneous vaginal delivery. Post-partum course was uncomplicated. Pain is well controlled with PRN medication. She has no difficulty with ambulation, voiding, or PO intake. Lab values and vital signs are within normal limits prior to discharge.  1) Please take acetaminophen and ibuprofen as needed for pain as prescribed.  2) Nothing in the vagina for 6 weeks (including no sex, no tampons, and no douching).  3) Please call your doctor for a follow up postpartum appointment in 4-6 weeks.  4) Please continue taking vitamins postpartum. Take iron and colace for acute blood loss anemia.  5) Please call the office sooner if you have heavy vaginal bleeding, severe abdominal pain, or fever > 100.4F.  6) You may resume regular daily activity as tolerated  Secondary Diagnosis:	Severe preeclampsia  Assessment and plan of treatment:	You had chronic hypertension with superimposed preeclampsia during your pregnancy. Continue to take Procardia 60XL once a day. Take blood pressures twice - once in AM and once in PM. Please monitor for blood pressures >140/>90, headaches, visual disturbances, RUQ pain, epigastric pain and new-onset edema - call your doctor if you have any of these.   Principal Discharge DX:	 (normal spontaneous vaginal delivery)  Assessment and plan of treatment:	Patient underwent a normal spontaneous vaginal delivery. Post-partum course was uncomplicated. Pain is well controlled with PRN medication. She has no difficulty with ambulation, voiding, or PO intake. Lab values and vital signs are within normal limits prior to discharge.  1) Please take acetaminophen and ibuprofen as needed for pain as prescribed.  2) Nothing in the vagina for 6 weeks (including no sex, no tampons, and no douching).  3) Please call your doctor for a follow up postpartum appointment in 4-6 weeks.  4) Please continue taking vitamins postpartum. Take iron and colace for acute blood loss anemia.  5) Please call the office sooner if you have heavy vaginal bleeding, severe abdominal pain, or fever > 100.4F.  6) You may resume regular daily activity as tolerated  Secondary Diagnosis:	Severe preeclampsia  Assessment and plan of treatment:	You had chronic hypertension with superimposed preeclampsia during your pregnancy. Continue to take Procardia 90XL once a day. Take blood pressures twice - once in AM and once in PM. Please monitor for blood pressures >140/>90, headaches, visual disturbances, RUQ pain, epigastric pain and new-onset edema - call your doctor if you have any of these.

## 2023-03-08 VITALS
OXYGEN SATURATION: 97 % | TEMPERATURE: 99 F | HEART RATE: 80 BPM | SYSTOLIC BLOOD PRESSURE: 122 MMHG | DIASTOLIC BLOOD PRESSURE: 80 MMHG | RESPIRATION RATE: 18 BRPM

## 2023-03-08 PROCEDURE — 83615 LACTATE (LD) (LDH) ENZYME: CPT

## 2023-03-08 PROCEDURE — 85730 THROMBOPLASTIN TIME PARTIAL: CPT

## 2023-03-08 PROCEDURE — U0003: CPT

## 2023-03-08 PROCEDURE — 85025 COMPLETE CBC W/AUTO DIFF WBC: CPT

## 2023-03-08 PROCEDURE — U0005: CPT

## 2023-03-08 PROCEDURE — 80053 COMPREHEN METABOLIC PANEL: CPT

## 2023-03-08 PROCEDURE — 84156 ASSAY OF PROTEIN URINE: CPT

## 2023-03-08 PROCEDURE — T1013: CPT

## 2023-03-08 PROCEDURE — 85027 COMPLETE CBC AUTOMATED: CPT

## 2023-03-08 PROCEDURE — 85610 PROTHROMBIN TIME: CPT

## 2023-03-08 PROCEDURE — 86901 BLOOD TYPING SEROLOGIC RH(D): CPT

## 2023-03-08 PROCEDURE — 36415 COLL VENOUS BLD VENIPUNCTURE: CPT

## 2023-03-08 PROCEDURE — 86850 RBC ANTIBODY SCREEN: CPT

## 2023-03-08 PROCEDURE — 82570 ASSAY OF URINE CREATININE: CPT

## 2023-03-08 PROCEDURE — 84550 ASSAY OF BLOOD/URIC ACID: CPT

## 2023-03-08 PROCEDURE — 83735 ASSAY OF MAGNESIUM: CPT

## 2023-03-08 PROCEDURE — 88307 TISSUE EXAM BY PATHOLOGIST: CPT

## 2023-03-08 PROCEDURE — 86900 BLOOD TYPING SEROLOGIC ABO: CPT

## 2023-03-08 PROCEDURE — 86780 TREPONEMA PALLIDUM: CPT

## 2023-03-08 PROCEDURE — 81001 URINALYSIS AUTO W/SCOPE: CPT

## 2023-03-08 RX ORDER — NIFEDIPINE 30 MG
1 TABLET, EXTENDED RELEASE 24 HR ORAL
Qty: 30 | Refills: 0
Start: 2023-03-08 | End: 2023-04-06

## 2023-03-08 RX ORDER — LABETALOL HCL 100 MG
200 TABLET ORAL ONCE
Refills: 0 | Status: COMPLETED | OUTPATIENT
Start: 2023-03-08 | End: 2023-03-08

## 2023-03-08 RX ORDER — LABETALOL HCL 100 MG
1 TABLET ORAL
Qty: 60 | Refills: 0
Start: 2023-03-08 | End: 2023-04-06

## 2023-03-08 RX ADMIN — Medication 90 MILLIGRAM(S): at 05:58

## 2023-03-08 RX ADMIN — Medication 975 MILLIGRAM(S): at 03:16

## 2023-03-08 RX ADMIN — Medication 600 MILLIGRAM(S): at 05:58

## 2023-03-08 RX ADMIN — Medication 975 MILLIGRAM(S): at 09:06

## 2023-03-08 RX ADMIN — Medication 200 MILLIGRAM(S): at 09:01

## 2023-03-08 RX ADMIN — Medication 975 MILLIGRAM(S): at 09:02

## 2023-03-08 NOTE — PROGRESS NOTE ADULT - SUBJECTIVE AND OBJECTIVE BOX
33yo  s/p  pp # 3  CHTN with superimposed PEC s/p mag  On nifed xl 90mg    no complaints    Exam  ICU Vital Signs Last 24 Hrs  T(C): 36.7 (08 Mar 2023 04:40), Max: 37.2 (07 Mar 2023 11:47)  T(F): 98.1 (08 Mar 2023 04:40), Max: 98.9 (07 Mar 2023 11:47)  HR: 81 (08 Mar 2023 06:00) (71 - 92)  BP: 141/93 (08 Mar 2023 06:00) (117/82 - 145/99)  BP(mean): --  ABP: --  ABP(mean): --  RR: 18 (08 Mar 2023 04:40) (18 - 18)  SpO2: 96% (08 Mar 2023 04:40) (96% - 98%)    O2 Parameters below as of 08 Mar 2023 04:40  Patient On (Oxygen Delivery Method): room air            
S: Patient doing well. Minimal lochia. No complaints.     O: Vital Signs Last 24 Hrs  T(C): 37.2 (07 Mar 2023 05:08), Max: 37.2 (07 Mar 2023 05:08)  T(F): 98.9 (07 Mar 2023 05:08), Max: 98.9 (07 Mar 2023 05:08)  HR: 65 (07 Mar 2023 05:52) (62 - 108)  BP: 123/84 (07 Mar 2023 05:52) (118/75 - 152/90)  BP(mean): --  RR: 18 (07 Mar 2023 05:08) (18 - 18)  SpO2: 96% (07 Mar 2023 05:08) (95% - 100%)    Parameters below as of 07 Mar 2023 05:08  Patient On (Oxygen Delivery Method): room air        Gen: NAD  Breast :  Abd: soft, NT, ND, fundus firm below umbilicus  Ext: no tenderness    Labs:                        11.7   13.27 )-----------( 252      ( 06 Mar 2023 04:59 )             35.0            PP # 2 s/p     Doing well.  Discharge home.  Nothing in vagina and no heavy lifting for 6 weeks.   Follow up 6 weeks for post partum visit.  Call office for any fevers, chills, heavy vaginal bleeding, symptoms of depression, or any other concerning symptoms.  Continue motrin 600 mg every 6 hours.              
TOMY LARA is a 32y  now PPD#1 s/p spontaneous vaginal delivery at 37w, IOL for cHTN complicated by cHTNsiPECwSFoM s/p hydralazine 5mg IVP @1620 yesterday on Procardia 60XL    S:    No acute events overnight.   The patient has no complaints.  Pain controlled with current treatment regimen.   She is tolerating PO.   + flatus/-BM/+ voiding   She endorses appropriate lochia, which is decreasing.   She is breastfeeding without difficulty.   She denies fevers, chills, nausea and vomiting.   She denies headaches, visual disturbances, RUQ pain, epigastric pain and new-onset edema. Denies drowsiness, somnolence and SOB.      O:    T(C): 36.9 (23 @ 06:00), Max: 37.4 (23 @ 20:30)  HR: 75 (23 @ 06:00) (65 - 117)  BP: 146/96 (23 @ 06:00) (138/99 - 171/107)  RR: 18 (23 @ 06:00) (14 - 19)  SpO2: 95% (23 @ 06:00) (84% - 98%)    Gen: NAD, AOx3  CV: RRR, S1/S2 present  Pulm: CTAB  Abdomen:  Soft, non-tender, non-distended, +bowel sounds  Uterus:  Fundus firm below umbilicus  VE:  Expected lochia  Ext:  b/l LE non-tender                           11.7   13.27 )-----------( 252      ( 06 Mar 2023 04:59 )             35.0     -    139  |  103  |  5.1<L>  ----------------------------<  81  3.8   |  23.0  |  0.44<L>    Ca    7.2<L>      06 Mar 2023 04:59  Mg     6.0     -    TPro  6.2<L>  /  Alb  3.2<L>  /  TBili  <0.2<L>  /  DBili  x   /  AST  20  /  ALT  12  /  AlkPhos  144<H>  -  
TOMY LARA is a 32y  now PPD#2 s/p spontaneous vaginal delivery at 37w, IOL for cHTN complicated by cHTNsiPECwSF s/p magnesium, s/p IVP hydralazine 3/ and on Procardia 60XL.    S:    No acute events overnight.   The patient has no complaints.  Pain controlled with current treatment regimen.   She is tolerating PO.   + flatus/-BM/+ voiding   She endorses appropriate lochia, which is decreasing.   She is breastfeeding without difficulty.   She denies fevers, chills, nausea and vomiting.   She denies headaches, visual disturbances, RUQ pain, epigastric pain and new-onset edema. Denies drowsiness, somnolence and SOB.      O:    Vital Signs Last 24 Hrs  T(C): 37.2 (07 Mar 2023 05:08), Max: 37.2 (07 Mar 2023 05:08)  T(F): 98.9 (07 Mar 2023 05:08), Max: 98.9 (07 Mar 2023 05:08)  HR: 65 (07 Mar 2023 05:52) (62 - 108)  BP: 123/84 (07 Mar 2023 05:52) (118/75 - 152/90)  BP(mean): --  RR: 18 (07 Mar 2023 05:08) (18 - 18)  SpO2: 96% (07 Mar 2023 05:08) (95% - 100%)    Parameters below as of 07 Mar 2023 05:08  Patient On (Oxygen Delivery Method): room air      Gen: NAD, AOx3  CV: RRR, S1/S2 present  Pulm: CTAB  Abdomen:  Soft, non-tender, non-distended  Uterus:  Fundus firm below umbilicus  VE:  Expected lochia  Ext:  b/l LE non-tender                           11.7   13.27 )-----------( 252      ( 06 Mar 2023 04:59 )             35.0     03-06    139  |  103  |  5.1<L>  ----------------------------<  81  3.8   |  23.0  |  0.44<L>    Ca    7.2<L>      06 Mar 2023 04:59  Mg     6.0     03-06    TPro  6.2<L>  /  Alb  3.2<L>  /  TBili  <0.2<L>  /  DBili  x   /  AST  20  /  ALT  12  /  AlkPhos  144<H>    
TOMY LARA is a 32y  now PPD#3 s/p spontaneous vaginal delivery at 37w, IOL for cHTN complicated by cHTNsiPECwSF s/p magnesium, s/p IVP hydralazine 3/ and on Procardia 90XL.    S:    No acute events overnight.   The patient has no complaints.  Pain controlled with current treatment regimen.   She is tolerating PO.   + flatus/+ voiding   She endorses appropriate lochia, which is decreasing.   She is breastfeeding without difficulty.   She denies fevers, chills, nausea and vomiting.   She denies headaches, visual disturbances, RUQ pain, epigastric pain and new-onset edema. Denies drowsiness, somnolence and SOB.      O:    Vital Signs Last 24 Hrs  T(C): 36.7 (08 Mar 2023 04:40), Max: 37.2 (07 Mar 2023 11:47)  T(F): 98.1 (08 Mar 2023 04:40), Max: 98.9 (07 Mar 2023 11:47)  HR: 81 (08 Mar 2023 06:00) (71 - 88)  BP: 141/93 (08 Mar 2023 06:00) (117/82 - 141/93)  BP(mean): --  RR: 18 (08 Mar 2023 04:40) (18 - 18)  SpO2: 96% (08 Mar 2023 04:40) (96% - 98%)    Parameters below as of 08 Mar 2023 04:40  Patient On (Oxygen Delivery Method): room air          Gen: NAD, AOx3  CV: RRR, S1/S2 present  Pulm: CTAB  Abdomen:  Soft, non-tender, non-distended  Uterus:  Fundus firm below umbilicus  VE:  Expected lochia  Ext:  b/l LE non-tender                           11.7   13.27 )-----------( 252      ( 06 Mar 2023 04:59 )             35.0     03-06    139  |  103  |  5.1<L>  ----------------------------<  81  3.8   |  23.0  |  0.44<L>    Ca    7.2<L>      06 Mar 2023 04:59  Mg     6.0     03-06    TPro  6.2<L>  /  Alb  3.2<L>  /  TBili  <0.2<L>  /  DBili  x   /  AST  20  /  ALT  12  /  AlkPhos  144<H>

## 2023-03-08 NOTE — PROGRESS NOTE ADULT - ASSESSMENT
TOMY LARA is a 32y  now PPD#1 s/p spontaneous vaginal delivery at 37w, IOL for cHTN complicated by cHTNsiPECwSFoM s/p hydralazine 5mg IVP @1620 yesterday on Procardia 60XL  -Vital signs stable. BPs 140-150s/90s overnight. On Procardia 30XL qAM outpatient - r/c additional Procardia 30XL yesterday PM and will start Procardia 60XL this AM.  -Hgb: 11.5 -> 11.7  -PPD#1 PIH labs wnl  -Mg level 6.0. Continue to f/u magnesium levels  -Voiding, tolerating PO  -Advance care as tolerated   -Continue routine postpartum care and education  -Healthy male infant, declines circumcision  -Dispo: Continue inpatient management  
TOMY LARA is a 32y  now PPD#3 s/p spontaneous vaginal delivery at 37w, IOL for cHTN complicated by cHTNsiPECwSF s/p magnesium, s/p IVP hydralazine 3/5 and on Procardia 90XL.  -Vital signs stable. BPs 130s/70-80s overnight. On Procardia 90XL qAM.  -Hgb: 11.5 -> 11.7, stable  -PPD#1 PIH labs wnl  -Voiding, tolerating PO  -Advance care as tolerated   -Continue routine postpartum care and education  -Healthy male infant, declines circumcision  -Dispo: Discharge home today per attending  
post  day # 3  no complaints  exam wnl  labs reviewed    cleared for dc on nfed 90xl  discussed contraception, vaccination, breastfeeding  follow up for post partum visit in 3 d  
TOMY LARA is a 32y  now PPD#2 s/p spontaneous vaginal delivery at 37w, IOL for cHTN complicated by cHTNsiPECwSF s/p magnesium, s/p IVP hydralazine 3/5 and on Procardia 60XL.  -Vital signs stable. BPs 110-120s/70-80s overnight. On Procardia 60XL qAM.  -Hgb: 11.5 -> 11.7, stable  -PPD#1 PIH labs wnl  -Mg level 6.0 -> 5.4.  -Voiding, tolerating PO  -Advance care as tolerated   -Continue routine postpartum care and education  -Healthy male infant, declines circumcision  -Dispo: Possible discharge to home if BPs continue to be well controlled

## 2023-03-08 NOTE — PROGRESS NOTE ADULT - ATTENDING COMMENTS
32y  now PPD#1 s/p spontaneous vaginal delivery at 37w, IOL for cHTN complicated by cHTNsiPECwSFoM s/p hydralazine 5mg IVP @1620 yesterday on Procardia 60XL. Pt reports feeling overall well, but does feel the effects of mag. Pain controlled, claritza diet, +amb, mild lochia. Denies HA, vision changes or RUQ pain  HR: 75 (23 @ 06:00) (65 - 117)  BP: 146/96 (23 @ 06:00) (138/99 - 171/107)  Caballero in place  PPD#1 s/p vd with cHTN and superimposed PEC  - PEC - due to severe range BP, On magnesium until this afternoon.          - Procardia 30 --> Procardia 60        - BPs 140/90 will monitor  - PP - pain control         - diet as claritza        - routine PP Care    Paris Larios MD
POD#3  pt seen by Dr. Castillo in the AM, to be discharged home on Procardia 90XL, labetalol discontinued, blood pressure well controlled with Procardia 90 XL, patient will f/u in the office in 2-5 days for BP check.   ppalos

## 2023-03-13 ENCOUNTER — APPOINTMENT (OUTPATIENT)
Dept: ANTEPARTUM | Facility: CLINIC | Age: 33
End: 2023-03-13

## 2023-03-15 LAB
SURGICAL PATHOLOGY STUDY: SIGNIFICANT CHANGE UP

## 2023-03-20 ENCOUNTER — APPOINTMENT (OUTPATIENT)
Dept: ANTEPARTUM | Facility: CLINIC | Age: 33
End: 2023-03-20

## 2023-04-25 NOTE — OB RN PATIENT PROFILE - HBSAG: DATE, OB PROFILE
April 25, 2023     Patient: Solon Schaumann  YOB: 2004  Date of Visit: 4/25/2023      To Whom it May Concern:    Solon Schaumann is under my professional care  Arbie Saint was seen in my office on 4/25/2023  Arbie Saint may return to school on 4/25/23  If you have any questions or concerns, please don't hesitate to call           Sincerely,          BOYD Vega        CC: No Recipients
08-Sep-2022

## 2023-11-19 RX ORDER — NIFEDIPINE 30 MG
1 TABLET, EXTENDED RELEASE 24 HR ORAL
Qty: 0 | Refills: 0 | DISCHARGE

## 2024-09-26 NOTE — OB PROVIDER IHI INDUCTION/AUGMENTATION NOTE - NS_EFW_OBGYN_ALL_OB_FT
1. Wound care     - Remove the dressing 24 hours after the procedure.     - Do not submerge the site in water for at least 5 days.     - Avoid lotion, powder, cream around the site for 5 days    2. You may shower 24 hours after the procedure. Use only soap and water to clean the area.     3. Do not drive for 2 days following the procedure.    4. Activity Restrictions     - Avoid lifting anything heavier than a gallon of milk for 5 days.    5. **Signs of Infection or Complications:**       - Go to the nearest emergency room if you experience any of the following:       - Fever       - Discharge from the site       - Redness or swelling around the site    6. If the site starts to bleed, lay flat on the ground, apply firm pressure to the site, and call 911 immediately.  
[id number]
